# Patient Record
Sex: MALE | Race: WHITE | Employment: FULL TIME | URBAN - NONMETROPOLITAN AREA
[De-identification: names, ages, dates, MRNs, and addresses within clinical notes are randomized per-mention and may not be internally consistent; named-entity substitution may affect disease eponyms.]

---

## 2017-07-07 ENCOUNTER — PATIENT OUTREACH (OUTPATIENT)
Dept: FAMILY MEDICINE CLINIC | Facility: CLINIC | Age: 62
End: 2017-07-07

## 2017-12-22 ENCOUNTER — OFFICE VISIT (OUTPATIENT)
Dept: FAMILY MEDICINE CLINIC | Facility: CLINIC | Age: 62
End: 2017-12-22

## 2017-12-22 VITALS
SYSTOLIC BLOOD PRESSURE: 138 MMHG | HEART RATE: 84 BPM | BODY MASS INDEX: 34.3 KG/M2 | HEIGHT: 71 IN | WEIGHT: 245 LBS | DIASTOLIC BLOOD PRESSURE: 80 MMHG | OXYGEN SATURATION: 96 % | TEMPERATURE: 98 F

## 2017-12-22 DIAGNOSIS — Z12.5 SCREENING FOR PROSTATE CANCER: ICD-10-CM

## 2017-12-22 DIAGNOSIS — Z12.11 SCREEN FOR COLON CANCER: ICD-10-CM

## 2017-12-22 DIAGNOSIS — Z96.652 STATUS POST LEFT KNEE REPLACEMENT: ICD-10-CM

## 2017-12-22 DIAGNOSIS — Z23 NEED FOR VACCINATION: ICD-10-CM

## 2017-12-22 DIAGNOSIS — Z13.29 SCREENING FOR THYROID DISORDER: ICD-10-CM

## 2017-12-22 DIAGNOSIS — Z00.00 PREVENTATIVE HEALTH CARE: Primary | ICD-10-CM

## 2017-12-22 DIAGNOSIS — Z00.00 LABORATORY EXAM ORDERED AS PART OF ROUTINE GENERAL MEDICAL EXAMINATION: ICD-10-CM

## 2017-12-22 DIAGNOSIS — Z13.220 SCREENING FOR LIPID DISORDERS: ICD-10-CM

## 2017-12-22 DIAGNOSIS — E66.9 OBESITY (BMI 30.0-34.9): ICD-10-CM

## 2017-12-22 DIAGNOSIS — D12.6 TUBULAR ADENOMA OF COLON: ICD-10-CM

## 2017-12-22 PROCEDURE — 90472 IMMUNIZATION ADMIN EACH ADD: CPT | Performed by: FAMILY MEDICINE

## 2017-12-22 PROCEDURE — 99386 PREV VISIT NEW AGE 40-64: CPT | Performed by: FAMILY MEDICINE

## 2017-12-22 PROCEDURE — 90471 IMMUNIZATION ADMIN: CPT | Performed by: FAMILY MEDICINE

## 2017-12-22 PROCEDURE — 90715 TDAP VACCINE 7 YRS/> IM: CPT | Performed by: FAMILY MEDICINE

## 2017-12-22 PROCEDURE — 90686 IIV4 VACC NO PRSV 0.5 ML IM: CPT | Performed by: FAMILY MEDICINE

## 2017-12-22 RX ORDER — LISINOPRIL 10 MG/1
5 TABLET ORAL DAILY
COMMUNITY
Start: 2010-11-10 | End: 2017-12-22 | Stop reason: ALTCHOICE

## 2017-12-22 RX ORDER — LANSOPRAZOLE 15 MG/1
15 CAPSULE, DELAYED RELEASE ORAL DAILY
COMMUNITY

## 2017-12-22 NOTE — H&P
Rashaun Gonzalez is a 65year old male who presents for a complete physical exam.   HPI:   Pt voices no concerns     Wt Readings from Last 6 Encounters:  12/22/17 : 245 lb  11/26/14 : 230 lb  10/28/14 : 239 lb  09/15/14 : 235 lb  09/08/14 : 228 lb  09/02/ symptoms, no claudication , no peripheral edema  GI: denies abdominal pain,denies constipation, diarrhea, or change in bowel habits, + occasional heartburn if he eats late  : denies dysuria, hesitancy,nocturia, decreased urine stream, incontinence, erect touch right anterior thigh in the L2 dermatome, negative Tinel sign at wrist and elbows  PSYCH: affect: bright, speech: clear and coherent, Insight: appropriate  ASSESSMENT AND PLAN:   Wm Gates is a 65year old male   Preventative health care  (pr postmeal blood sugars as well as hemoglobin A1c, blood pressure lipids. I reviewed conservative management of hypertension including sodium restriction, daily aerobic activity, alcohol moderation, and maintaining ideal body weight.   I discussed importance

## 2017-12-22 NOTE — PATIENT INSTRUCTIONS
I reviewed age-appropriate preventive health screening exams. I reviewed last colonoscopy and pathology report.   Would recommend follow-up colonoscopy due to tubular adenoma  Patient given contact information for Dr. Trent Gayle as he wishes to keep his care lo

## 2018-03-24 ENCOUNTER — NURSE ONLY (OUTPATIENT)
Dept: FAMILY MEDICINE CLINIC | Facility: CLINIC | Age: 63
End: 2018-03-24

## 2018-03-24 VITALS — SYSTOLIC BLOOD PRESSURE: 152 MMHG | DIASTOLIC BLOOD PRESSURE: 90 MMHG

## 2018-03-24 DIAGNOSIS — Z00.00 LABORATORY EXAM ORDERED AS PART OF ROUTINE GENERAL MEDICAL EXAMINATION: ICD-10-CM

## 2018-03-24 DIAGNOSIS — Z13.220 SCREENING FOR LIPID DISORDERS: ICD-10-CM

## 2018-03-24 DIAGNOSIS — Z12.5 SCREENING FOR PROSTATE CANCER: ICD-10-CM

## 2018-03-24 DIAGNOSIS — Z13.29 SCREENING FOR THYROID DISORDER: ICD-10-CM

## 2018-03-24 LAB
ALBUMIN SERPL-MCNC: 3.7 G/DL (ref 3.5–4.8)
ALP LIVER SERPL-CCNC: 91 U/L (ref 45–117)
ALT SERPL-CCNC: 28 U/L (ref 17–63)
AST SERPL-CCNC: 26 U/L (ref 15–41)
BILIRUB SERPL-MCNC: 0.6 MG/DL (ref 0.1–2)
BUN BLD-MCNC: 12 MG/DL (ref 8–20)
CALCIUM BLD-MCNC: 9.2 MG/DL (ref 8.3–10.3)
CHLORIDE: 107 MMOL/L (ref 101–111)
CHOLEST SMN-MCNC: 180 MG/DL (ref ?–200)
CO2: 25 MMOL/L (ref 22–32)
COMPLEXED PSA SERPL-MCNC: 0.86 NG/ML (ref 0.01–4)
CREAT BLD-MCNC: 0.96 MG/DL (ref 0.7–1.3)
GLUCOSE BLD-MCNC: 105 MG/DL (ref 70–99)
HDLC SERPL-MCNC: 46 MG/DL (ref 45–?)
HDLC SERPL: 3.91 {RATIO} (ref ?–4.97)
LDLC SERPL CALC-MCNC: 113 MG/DL (ref ?–130)
M PROTEIN MFR SERPL ELPH: 6.9 G/DL (ref 6.1–8.3)
NONHDLC SERPL-MCNC: 134 MG/DL (ref ?–130)
POTASSIUM SERPL-SCNC: 4.5 MMOL/L (ref 3.6–5.1)
SODIUM SERPL-SCNC: 140 MMOL/L (ref 136–144)
TRIGL SERPL-MCNC: 106 MG/DL (ref ?–150)
TSI SER-ACNC: 0.51 MIU/ML (ref 0.35–5.5)
VLDLC SERPL CALC-MCNC: 21 MG/DL (ref 5–40)

## 2018-03-24 PROCEDURE — 84153 ASSAY OF PSA TOTAL: CPT | Performed by: FAMILY MEDICINE

## 2018-03-24 PROCEDURE — 80061 LIPID PANEL: CPT | Performed by: FAMILY MEDICINE

## 2018-03-24 PROCEDURE — 80053 COMPREHEN METABOLIC PANEL: CPT | Performed by: FAMILY MEDICINE

## 2018-03-24 PROCEDURE — 84443 ASSAY THYROID STIM HORMONE: CPT | Performed by: FAMILY MEDICINE

## 2018-03-24 PROCEDURE — 36415 COLL VENOUS BLD VENIPUNCTURE: CPT | Performed by: FAMILY MEDICINE

## 2018-03-24 NOTE — PROGRESS NOTES
Pt here for labwork. States his BP was elevated at Dr. Arley Gandhi yesterday-----159/?. Checked BP here today--- 152/90. Pt does not have a BP monitor at home. Do you want to see him? Nurse appt for another BP check?

## 2018-03-24 NOTE — PROGRESS NOTES
Have patient begin monitoring his blood pressure at home and follow-up in the office over the next 1-2 weeks with his monitor for comparison as well as his blood pressure readings

## 2018-04-12 ENCOUNTER — MED REC SCAN ONLY (OUTPATIENT)
Dept: FAMILY MEDICINE CLINIC | Facility: CLINIC | Age: 63
End: 2018-04-12

## 2018-04-21 ENCOUNTER — OFFICE VISIT (OUTPATIENT)
Dept: FAMILY MEDICINE CLINIC | Facility: CLINIC | Age: 63
End: 2018-04-21

## 2018-04-21 VITALS
TEMPERATURE: 98 F | SYSTOLIC BLOOD PRESSURE: 140 MMHG | OXYGEN SATURATION: 98 % | DIASTOLIC BLOOD PRESSURE: 90 MMHG | BODY MASS INDEX: 33 KG/M2 | HEART RATE: 78 BPM | WEIGHT: 235 LBS

## 2018-04-21 DIAGNOSIS — E66.9 OBESITY (BMI 30.0-34.9): ICD-10-CM

## 2018-04-21 DIAGNOSIS — Z99.89 OSA ON CPAP: ICD-10-CM

## 2018-04-21 DIAGNOSIS — I10 ESSENTIAL HYPERTENSION: Primary | ICD-10-CM

## 2018-04-21 DIAGNOSIS — G47.33 OSA ON CPAP: ICD-10-CM

## 2018-04-21 PROCEDURE — 99213 OFFICE O/P EST LOW 20 MIN: CPT | Performed by: FAMILY MEDICINE

## 2018-04-21 RX ORDER — LISINOPRIL 10 MG/1
10 TABLET ORAL DAILY
Qty: 30 TABLET | Refills: 3 | Status: SHIPPED | OUTPATIENT
Start: 2018-04-21 | End: 2018-08-27

## 2018-04-21 NOTE — PROGRESS NOTES
Luis Alberto Hackett is a 65year old male. Patient presents with:  HTN: f/u  Obstructive Sleep Apnea (AILEEN): f/u    HPI:   Patient was found to have elevated blood pressure at a nurse appointment for labs.   He had been asked to monitor his blood pressure at [facility] no dc, Throat: no erythema, pnd, or lesions  NECK: supple,no adenopathy,no bruits, no thyromegaly  LUNGS: clear to auscultation, no w/r/r  CARDIO: RRR without murmur, peripheral pulses intact  GI: good BS's,no masses, HSM or tenderness  EXTREMITIES: FROM o

## 2018-05-11 ENCOUNTER — OFFICE VISIT (OUTPATIENT)
Dept: FAMILY MEDICINE CLINIC | Facility: CLINIC | Age: 63
End: 2018-05-11

## 2018-05-11 VITALS
BODY MASS INDEX: 33 KG/M2 | OXYGEN SATURATION: 99 % | TEMPERATURE: 98 F | WEIGHT: 237 LBS | SYSTOLIC BLOOD PRESSURE: 128 MMHG | DIASTOLIC BLOOD PRESSURE: 70 MMHG | HEART RATE: 76 BPM

## 2018-05-11 DIAGNOSIS — S76.012A STRAIN OF FLEXOR MUSCLE OF LEFT HIP, INITIAL ENCOUNTER: Primary | ICD-10-CM

## 2018-05-11 DIAGNOSIS — S76.012A STRAIN OF LEFT HIP ADDUCTOR MUSCLE, INITIAL ENCOUNTER: ICD-10-CM

## 2018-05-11 PROCEDURE — 99213 OFFICE O/P EST LOW 20 MIN: CPT | Performed by: FAMILY MEDICINE

## 2018-05-11 NOTE — PROGRESS NOTES
HPI:    Patient ID: Chana Tariq is a 65year old male.   Patient presents with:  Leg Pain: GROIN PAIN--- STARTED 1 WEEK AGO---    HPI c/o left anterior groin pain, pt states 4/30 he stepped down out of his truck and felt pain, using , ice, tape, , hx Blood pressure 128/70, pulse 76, temperature 98.1 °F (36.7 °C), temperature source Temporal, weight 237 lb, SpO2 99 %.          ASSESSMENT/PLAN:   Strain of flexor muscle of left hip, initial encounter  (primary encounter diagnosis)  Strain of left hip add

## 2018-05-11 NOTE — PATIENT INSTRUCTIONS
Would recommend use of moist heat for up to 20 minutes every 4 hours as needed  May use ibuprofen up to 800 mg 3 times daily with food as needed for pain or discomfort  Strongly encourage initiating physical therapy.   Patient give [phone] central scheduling

## 2018-05-17 ENCOUNTER — OFFICE VISIT (OUTPATIENT)
Dept: PHYSICAL THERAPY | Age: 63
End: 2018-05-17
Attending: FAMILY MEDICINE
Payer: OTHER MISCELLANEOUS

## 2018-05-17 DIAGNOSIS — S76.012A STRAIN OF LEFT HIP ADDUCTOR MUSCLE, INITIAL ENCOUNTER: ICD-10-CM

## 2018-05-17 DIAGNOSIS — S76.012A STRAIN OF FLEXOR MUSCLE OF LEFT HIP, INITIAL ENCOUNTER: ICD-10-CM

## 2018-05-17 PROCEDURE — 97161 PT EVAL LOW COMPLEX 20 MIN: CPT

## 2018-05-17 PROCEDURE — 97140 MANUAL THERAPY 1/> REGIONS: CPT

## 2018-05-17 NOTE — PROGRESS NOTES
LOWER EXTREMITY EVALUATION:   Referring Physician: Dr. Sudheer Laurent  Diagnosis: L hip flexor strain     Date of Service: 5/17/2018     PATIENT SUMMARY   Oxana Loving is a 65year old y/o male who presents to therapy today with complaints of L hip pain st Flexibility:  Hip Flexor: R Mild tightness, L Significant tightness  Hamstrings: R Mild tightness; L Mild tightness  Piriformis: R Mild tightness; L Moderate tightness  Quads: R Mild tightness; L Moderate tightness    Strength/MMT:   Hip Knee   Flexi or treatment limitation: None  Rehab Potential:good    FOTO: 27/100      Patient/Family/Caregiver was advised of these findings, precautions, and treatment options and has agreed to actively participate in planning and for this course of care.     Thank you

## 2018-05-22 ENCOUNTER — OFFICE VISIT (OUTPATIENT)
Dept: PHYSICAL THERAPY | Age: 63
End: 2018-05-22
Attending: FAMILY MEDICINE
Payer: OTHER MISCELLANEOUS

## 2018-05-22 DIAGNOSIS — S76.012A STRAIN OF FLEXOR MUSCLE OF LEFT HIP, INITIAL ENCOUNTER: ICD-10-CM

## 2018-05-22 DIAGNOSIS — S76.012A STRAIN OF LEFT HIP ADDUCTOR MUSCLE, INITIAL ENCOUNTER: ICD-10-CM

## 2018-05-22 PROCEDURE — 97140 MANUAL THERAPY 1/> REGIONS: CPT

## 2018-05-22 PROCEDURE — 97014 ELECTRIC STIMULATION THERAPY: CPT

## 2018-05-22 PROCEDURE — 97035 APP MDLTY 1+ULTRASOUND EA 15: CPT

## 2018-05-22 PROCEDURE — 97110 THERAPEUTIC EXERCISES: CPT

## 2018-05-22 NOTE — PROGRESS NOTES
Dx: L hip          Authorized # of Visits:  10         Next MD visit: none scheduled  Fall Risk: standard         Precautions: n/a             Subjective: Felt good when I left, pain later in the day and worse the next day.  Pain high in outer hip and now m hip.    Charges:  Man therapy 2, therapeutic ex, US, e-stim       Total Timed Treatment: 45 min  Total Treatment Time: 55 min

## 2018-05-25 ENCOUNTER — OFFICE VISIT (OUTPATIENT)
Dept: PHYSICAL THERAPY | Age: 63
End: 2018-05-25
Attending: FAMILY MEDICINE
Payer: OTHER MISCELLANEOUS

## 2018-05-25 DIAGNOSIS — S76.012A STRAIN OF LEFT HIP ADDUCTOR MUSCLE, INITIAL ENCOUNTER: ICD-10-CM

## 2018-05-25 DIAGNOSIS — S76.012A STRAIN OF FLEXOR MUSCLE OF LEFT HIP, INITIAL ENCOUNTER: ICD-10-CM

## 2018-05-25 PROCEDURE — 97140 MANUAL THERAPY 1/> REGIONS: CPT

## 2018-05-25 PROCEDURE — 97014 ELECTRIC STIMULATION THERAPY: CPT

## 2018-05-25 PROCEDURE — 97035 APP MDLTY 1+ULTRASOUND EA 15: CPT

## 2018-05-25 PROCEDURE — 97110 THERAPEUTIC EXERCISES: CPT

## 2018-05-25 NOTE — PROGRESS NOTES
Dx: L hip          Authorized # of Visits:  10         Next MD visit: none scheduled  Fall Risk: standard         Precautions: n/a             Subjective: Was sore after last session for rest of day, but then felt good next two days.  Still can't get comfor Skilled Services: STM/ASTYM to L hip for pain and increase mobility of hip. ROM and stretching to increase mobility of hip. Charges:  Man therapy 2, therapeutic ex, US, e-stim       Total Timed Treatment: 45 min  Total Treatment Time: 55

## 2018-05-29 ENCOUNTER — OFFICE VISIT (OUTPATIENT)
Dept: PHYSICAL THERAPY | Age: 63
End: 2018-05-29
Attending: FAMILY MEDICINE
Payer: OTHER MISCELLANEOUS

## 2018-05-29 PROCEDURE — 97014 ELECTRIC STIMULATION THERAPY: CPT

## 2018-05-29 PROCEDURE — 97110 THERAPEUTIC EXERCISES: CPT

## 2018-05-29 PROCEDURE — 97035 APP MDLTY 1+ULTRASOUND EA 15: CPT

## 2018-05-29 PROCEDURE — 97140 MANUAL THERAPY 1/> REGIONS: CPT

## 2018-05-29 NOTE — PROGRESS NOTES
Dx: L hip          Authorized # of Visits:  10         Next MD visit: none scheduled  Fall Risk: standard         Precautions: n/a             Subjective: Groin feels better, but some pain in knee and lower leg.  Did a lot of running around house yesterday, PROM  6' Hip PROM  6' Hip PROM  8'       US  IP 7'  1.2 w/cm2  1mhz US  IP 7'  1.2 w/cm2  1mhz US  IP 7'  1.2 w/cm2  1mhz       IFC/CP  12'  L hip IFC/CP  12'  L hip IFC/CP  12'  L hip         Nu-step  10' Lv3                                  Skilled Sonna Guard

## 2018-05-30 ENCOUNTER — APPOINTMENT (OUTPATIENT)
Dept: PHYSICAL THERAPY | Age: 63
End: 2018-05-30
Attending: FAMILY MEDICINE
Payer: OTHER MISCELLANEOUS

## 2018-05-31 ENCOUNTER — OFFICE VISIT (OUTPATIENT)
Dept: PHYSICAL THERAPY | Age: 63
End: 2018-05-31
Attending: FAMILY MEDICINE
Payer: OTHER MISCELLANEOUS

## 2018-05-31 DIAGNOSIS — S76.012A STRAIN OF LEFT HIP ADDUCTOR MUSCLE, INITIAL ENCOUNTER: ICD-10-CM

## 2018-05-31 DIAGNOSIS — S76.012A STRAIN OF FLEXOR MUSCLE OF LEFT HIP, INITIAL ENCOUNTER: ICD-10-CM

## 2018-05-31 PROCEDURE — 97140 MANUAL THERAPY 1/> REGIONS: CPT

## 2018-05-31 PROCEDURE — 97110 THERAPEUTIC EXERCISES: CPT

## 2018-05-31 NOTE — PROGRESS NOTES
Dx: L hip          Authorized # of Visits:  10         Next MD visit: none scheduled  Fall Risk: standard         Precautions: n/a             Subjective: Continue to feel better in groin/hip flexor, but feel pain in R shin with standing and walking.   Monika Mcclain 3x30\" Stretching  IP 3x30\"  Hip add 3x30\"  ITB 3x30\"      Hip PROM  6' Hip PROM  6' Hip PROM  8' Hip PROM  8'      US  IP 7'  1.2 w/cm2  1mhz US  IP 7'  1.2 w/cm2  1mhz US  IP 7'  1.2 w/cm2  1mhz -----      IFC/CP  12'  L hip IFC/CP  12'  L hip IFC/CP

## 2018-06-11 ENCOUNTER — OFFICE VISIT (OUTPATIENT)
Dept: PHYSICAL THERAPY | Age: 63
End: 2018-06-11
Attending: FAMILY MEDICINE
Payer: OTHER MISCELLANEOUS

## 2018-06-11 PROCEDURE — 97110 THERAPEUTIC EXERCISES: CPT

## 2018-06-11 PROCEDURE — 97140 MANUAL THERAPY 1/> REGIONS: CPT

## 2018-06-11 NOTE — PROGRESS NOTES
Dx: L hip          Authorized # of Visits:  10         Next MD visit: none scheduled  Fall Risk: standard         Precautions: n/a             Subjective: Hip still feeling better, but get pain in shin area.  Can tell pain in shin is coming from the hip, ho Stretching  IP 3x30\"  Hip add 3x30\"  ITB 3x30\" Stretching  IP 3x30\"  Hip add 3x30\"  ITB 3x30\"     Hip PROM  6' Hip PROM  6' Hip PROM  8' Hip PROM  8' Hip PROM  8'     US  IP 7'  1.2 w/cm2  1mhz US  IP 7'  1.2 w/cm2  1mhz US  IP 7'  1.2 w/cm2  1mhz --

## 2018-06-14 ENCOUNTER — APPOINTMENT (OUTPATIENT)
Dept: PHYSICAL THERAPY | Age: 63
End: 2018-06-14
Attending: FAMILY MEDICINE
Payer: OTHER MISCELLANEOUS

## 2018-06-18 ENCOUNTER — OFFICE VISIT (OUTPATIENT)
Dept: PHYSICAL THERAPY | Age: 63
End: 2018-06-18
Attending: FAMILY MEDICINE
Payer: OTHER MISCELLANEOUS

## 2018-06-18 PROCEDURE — 97110 THERAPEUTIC EXERCISES: CPT

## 2018-06-18 PROCEDURE — 97140 MANUAL THERAPY 1/> REGIONS: CPT

## 2018-06-18 NOTE — PROGRESS NOTES
Dx: L hip          Authorized # of Visits:  10         Next MD visit: none scheduled  Fall Risk: standard         Precautions: n/a             Subjective: Pain greatly reduced.  Still get some discomfort when transitioning from sitting to standing, but much thigh   20'    Stretching  IP 3x30\"  Hip add 3x30\"  ITB 3x30\" Stretching  IP 3x30\"  Hip add 3x30\"  ITB 3x30\" Stretching  IP 3x30\"  Hip add 3x30\"  ITB 3x30\" Stretching  IP 3x30\"  Hip add 3x30\"  ITB 3x30\" Stretching  IP 3x30\"  Hip add 3x30\"  IT

## 2018-06-21 ENCOUNTER — OFFICE VISIT (OUTPATIENT)
Dept: PHYSICAL THERAPY | Age: 63
End: 2018-06-21
Attending: FAMILY MEDICINE
Payer: OTHER MISCELLANEOUS

## 2018-06-21 PROCEDURE — 97140 MANUAL THERAPY 1/> REGIONS: CPT

## 2018-06-21 PROCEDURE — 97110 THERAPEUTIC EXERCISES: CPT

## 2018-06-21 NOTE — PROGRESS NOTES
Dx: L hip          Authorized # of Visits:  10         Next MD visit: none scheduled  Fall Risk: standard         Precautions: n/a             Subjective: Feeling much better, able to sleep at night now.  Still feel tightness in anterior thigh, but not as b 3x30\"  Hip add 3x30\"  ITB 3x30\" Stretching  IP 3x30\"  Hip add 3x30\"  ITB 3x30\" Stretching  IP 3x30\"  Hip add 3x30\"  ITB 3x30\" Stretching  IP 3x30\"  Hip add 3x30\"  ITB 3x30\" Stretching  IP 3x30\"  Hip add 3x30\"  ITB 3x30\" Stretching  IP 3x30\"

## 2018-06-25 ENCOUNTER — OFFICE VISIT (OUTPATIENT)
Dept: PHYSICAL THERAPY | Age: 63
End: 2018-06-25
Attending: FAMILY MEDICINE
Payer: OTHER MISCELLANEOUS

## 2018-06-25 PROCEDURE — 97110 THERAPEUTIC EXERCISES: CPT

## 2018-06-25 PROCEDURE — 97140 MANUAL THERAPY 1/> REGIONS: CPT

## 2018-06-25 NOTE — PROGRESS NOTES
Dx: L hip          Authorized # of Visits:  10         Next MD visit: none scheduled  Fall Risk: standard         Precautions: n/a             Subjective: Continue to feel better, just healing slower than I'd like.  Still get some pain in shin when I first 3x30\" Stretching  IP 3x30\"  Hip add 3x30\"  ITB 3x30\" Stretching  IP 3x30\"  Hip add 3x30\"  ITB 3x30\" Stretching  IP 3x30\"  Hip add 3x30\"  ITB 3x30\" Stretching  IP 3x30\"  Hip add 3x30\"  ITB 3x30\" Stretching  IP 3x30\"  Hip add 3x30\"  ITB 3x30\"

## 2018-06-28 ENCOUNTER — OFFICE VISIT (OUTPATIENT)
Dept: PHYSICAL THERAPY | Age: 63
End: 2018-06-28
Attending: FAMILY MEDICINE
Payer: OTHER MISCELLANEOUS

## 2018-06-28 PROCEDURE — 97140 MANUAL THERAPY 1/> REGIONS: CPT

## 2018-06-28 PROCEDURE — 97110 THERAPEUTIC EXERCISES: CPT

## 2018-06-28 NOTE — PROGRESS NOTES
Progress Summary    Pt has attended 11, cancelled 1, and no shown 0 visits in Physical Therapy. Subjective: Feel 75-80% improved since start of therapy. Definitely getting better, but just not as fast as I'd like.  Pain in hip is mild and pain into margarita walking (8 visits)-on going  · Pt will demonstrate improved SLS to >30 seconds MELISSA to promote safety and decrease risk of falls on uneven surfaces such as grass (8 visits)- on going  · Pt will be able to squat to  light objects around the house with rectus, IP   12' STM  L rectus, IP   12'   Stretching  IP 3x30\"  Hip add 3x30\"  ITB 3x30\" Stretching  IP 3x30\"  Hip add 3x30\"  ITB 3x30\" Stretching  IP 3x30\"  Hip add 3x30\"  ITB 3x30\" Stretching  IP 3x30\"  Hip add 3x30\"  ITB 3x30\" Stretching  I

## 2018-07-02 ENCOUNTER — OFFICE VISIT (OUTPATIENT)
Dept: FAMILY MEDICINE CLINIC | Facility: CLINIC | Age: 63
End: 2018-07-02

## 2018-07-02 VITALS
OXYGEN SATURATION: 99 % | BODY MASS INDEX: 33 KG/M2 | WEIGHT: 235 LBS | TEMPERATURE: 98 F | DIASTOLIC BLOOD PRESSURE: 80 MMHG | SYSTOLIC BLOOD PRESSURE: 126 MMHG | HEART RATE: 70 BPM

## 2018-07-02 DIAGNOSIS — M75.42 IMPINGEMENT SYNDROME OF LEFT SHOULDER: ICD-10-CM

## 2018-07-02 DIAGNOSIS — S76.012D STRAIN OF FLEXOR MUSCLE OF LEFT HIP, SUBSEQUENT ENCOUNTER: Primary | ICD-10-CM

## 2018-07-02 PROCEDURE — 99214 OFFICE O/P EST MOD 30 MIN: CPT | Performed by: FAMILY MEDICINE

## 2018-07-02 NOTE — PROGRESS NOTES
HPI:    Patient ID: Mahesh You is a 65year old male. Patient presents with:  Hip Pain: f/u after therapy    HPI patient seen 5/11/18 for left hip flexor strain. He has been going to physical therapy since that time.   Last therapy report notes diego Left shoulder: Full range of motion with discomfort with abduction, mild anterior instability, negative labral grind, positive Neer's at 160°, positive Alvarez, negative Elliot sign    Patient able to squat and rise without discomfort, able to walk on toes a

## 2018-07-02 NOTE — PATIENT INSTRUCTIONS
I reviewed home exercise regimen, modified hip flexor and groin stretches, and calf stretching  Patient instructed in scapular stabilization and rotator cuff strengthening resistance band exercises.   Discussed proper posture  Continue physical therapy  Act

## 2018-07-05 ENCOUNTER — OFFICE VISIT (OUTPATIENT)
Dept: PHYSICAL THERAPY | Age: 63
End: 2018-07-05
Attending: FAMILY MEDICINE
Payer: OTHER MISCELLANEOUS

## 2018-07-05 PROCEDURE — 97110 THERAPEUTIC EXERCISES: CPT

## 2018-07-05 PROCEDURE — 97140 MANUAL THERAPY 1/> REGIONS: CPT

## 2018-07-05 NOTE — PROGRESS NOTES
Dx: L hip          Authorized # of Visits:  25         Next MD visit: none scheduled  Fall Risk: standard         Precautions: n/a             Subjective: MD said wants to continue PT, Add gastroc stretching. Worked late yesterday, very fatigued today. 3x30\"  Hip add 3x30\"  ITB 3x30\" Stretching  IP 3x30\"  Hip add 3x30\"  ITB 3x30\" Stretching  IP 3x30\"  Hip add 3x30\"  ITB 3x30\" Stretching  IP 3x30\"  Hip add 3x30\"  ITB 3x30\" Stretching  IP 3x30\"  Hip add 3x30\"  ITB 3x30\" Stretching  IP 3x30\"

## 2018-07-09 ENCOUNTER — OFFICE VISIT (OUTPATIENT)
Dept: PHYSICAL THERAPY | Age: 63
End: 2018-07-09
Attending: FAMILY MEDICINE
Payer: OTHER MISCELLANEOUS

## 2018-07-09 PROCEDURE — 97110 THERAPEUTIC EXERCISES: CPT

## 2018-07-09 PROCEDURE — 97140 MANUAL THERAPY 1/> REGIONS: CPT

## 2018-07-09 NOTE — PROGRESS NOTES
Dx: L hip          Authorized # of Visits:  25         Next MD visit: none scheduled  Fall Risk: standard         Precautions: n/a             Subjective: Was busy with party at house all weekend.  Between cleaning and entertaining was on feet all weekend thigh   15' STM  L rectus, IP   12' STM  L rectus, IP   12' STM  L rectus, IP   12' STM  L rectus, IP   18'   Stretching  IP 3x30\"  Hip add 3x30\"  ITB 3x30\" Stretching  IP 3x30\"  Hip add 3x30\"  ITB 3x30\" Stretching  IP 3x30\"  Hip add 3x30\"  ITB 3x3

## 2018-07-12 ENCOUNTER — OFFICE VISIT (OUTPATIENT)
Dept: PHYSICAL THERAPY | Age: 63
End: 2018-07-12
Attending: FAMILY MEDICINE
Payer: OTHER MISCELLANEOUS

## 2018-07-12 PROCEDURE — 97110 THERAPEUTIC EXERCISES: CPT

## 2018-07-12 PROCEDURE — 97140 MANUAL THERAPY 1/> REGIONS: CPT

## 2018-07-12 NOTE — PROGRESS NOTES
Dx: L hip          Authorized # of Visits:  25         Next MD visit: none scheduled  Fall Risk: standard         Precautions: n/a             Subjective: No pain on Tuesday, was sitting in truck most of the day.  Still get pain getting in and out of david IP   18' STM  L rectus, IP   18'   Stretching  IP 3x30\"  Hip add 3x30\"  ITB 3x30\" Stretching  IP 3x30\"  Hip add 3x30\"  ITB 3x30\" Stretching  IP 3x30\"  Hip add 3x30\"  ITB 3x30\" Stretching  IP 3x30\"  Hip add 3x30\"  ITB 3x30\" Stretching  IP 3x30\"

## 2018-07-16 ENCOUNTER — APPOINTMENT (OUTPATIENT)
Dept: PHYSICAL THERAPY | Age: 63
End: 2018-07-16
Attending: FAMILY MEDICINE
Payer: OTHER MISCELLANEOUS

## 2018-07-17 ENCOUNTER — OFFICE VISIT (OUTPATIENT)
Dept: PHYSICAL THERAPY | Age: 63
End: 2018-07-17
Attending: FAMILY MEDICINE
Payer: OTHER MISCELLANEOUS

## 2018-07-17 PROCEDURE — 97140 MANUAL THERAPY 1/> REGIONS: CPT

## 2018-07-17 PROCEDURE — 97110 THERAPEUTIC EXERCISES: CPT

## 2018-07-17 NOTE — PROGRESS NOTES
Dx: L hip          Authorized # of Visits:  25         Next MD visit: none scheduled  Fall Risk: standard         Precautions: n/a             Subjective: A little better, but still get pain getting in/out of truck and walking for lengths of time.      Ob 3x30\"  Hip add 3x30\"  ITB 3x30\" Stretching  IP 3x30\"  Hip add 3x30\"  ITB 3x30\" Stretching  IP 3x30\"  Hip add 3x30\"  ITB 3x30\" Stretching  IP 3x30\"  Hip add 3x30\"  ITB 3x30\" Stretching  IP 3x30\"  Hip add 3x30\"  gastroc 3x30\" Stretching  IP 3x

## 2018-07-19 ENCOUNTER — OFFICE VISIT (OUTPATIENT)
Dept: PHYSICAL THERAPY | Age: 63
End: 2018-07-19
Attending: FAMILY MEDICINE
Payer: OTHER MISCELLANEOUS

## 2018-07-19 PROCEDURE — 97035 APP MDLTY 1+ULTRASOUND EA 15: CPT

## 2018-07-19 PROCEDURE — 97140 MANUAL THERAPY 1/> REGIONS: CPT

## 2018-07-19 PROCEDURE — 97110 THERAPEUTIC EXERCISES: CPT

## 2018-07-19 NOTE — PROGRESS NOTES
Dx: L hip          Authorized # of Visits:  25         Next MD visit: none scheduled    Dx: L hip          Authorized # of Visits:  25         Next MD visit: none scheduled  Fall Risk: standard         Precautions: n/a             Subjective: Gloria Cancino 3x30\" Stretching  IP 3x30\"  Hip add 3x30\"  ITB 3x30\" Stretching  IP 3x30\"  Hip add 3x30\"  ITB 3x30\" Stretching  IP 3x30\"  Hip add 3x30\"  gastroc 3x30\" Stretching  IP 3x30\"  Hip add 3x30\"  gastroc 3x30\" Stretching  IP 3x30\"  Hip add 3x30\"  ga

## 2018-07-23 ENCOUNTER — OFFICE VISIT (OUTPATIENT)
Dept: PHYSICAL THERAPY | Age: 63
End: 2018-07-23
Attending: FAMILY MEDICINE
Payer: OTHER MISCELLANEOUS

## 2018-07-23 PROCEDURE — 97110 THERAPEUTIC EXERCISES: CPT

## 2018-07-23 PROCEDURE — 97140 MANUAL THERAPY 1/> REGIONS: CPT

## 2018-07-23 PROCEDURE — 97035 APP MDLTY 1+ULTRASOUND EA 15: CPT

## 2018-07-23 NOTE — PROGRESS NOTES
Dx: L hip pain         Authorized # of Visits:  25         Next MD visit: none scheduled    Dx: L hip          Authorized # of Visits:  25         Next MD visit: none scheduled  Fall Risk: standard         Precautions: n/a             Subjective: Some 7/19/2018  TX#: 5/8 Date: 7/23/2018  TX#: 6/8   STM/Astym  L hip 20' STM  L rectus, IP   12' STM  L rectus, IP   12' STM  L rectus, IP   18' STM  L rectus, IP   18' STM  L rectus, IP   18' STM  L rectus, IP   20' STM  L rectus, IP   20'   Stretching  IP 3x

## 2018-07-26 ENCOUNTER — OFFICE VISIT (OUTPATIENT)
Dept: FAMILY MEDICINE CLINIC | Facility: CLINIC | Age: 63
End: 2018-07-26
Payer: COMMERCIAL

## 2018-07-26 ENCOUNTER — HOSPITAL ENCOUNTER (OUTPATIENT)
Dept: GENERAL RADIOLOGY | Age: 63
Discharge: HOME | End: 2018-07-26
Attending: FAMILY MEDICINE
Payer: OTHER MISCELLANEOUS

## 2018-07-26 ENCOUNTER — TELEPHONE (OUTPATIENT)
Dept: FAMILY MEDICINE CLINIC | Facility: CLINIC | Age: 63
End: 2018-07-26

## 2018-07-26 ENCOUNTER — APPOINTMENT (OUTPATIENT)
Dept: PHYSICAL THERAPY | Age: 63
End: 2018-07-26
Attending: FAMILY MEDICINE
Payer: OTHER MISCELLANEOUS

## 2018-07-26 VITALS
DIASTOLIC BLOOD PRESSURE: 80 MMHG | SYSTOLIC BLOOD PRESSURE: 120 MMHG | OXYGEN SATURATION: 99 % | BODY MASS INDEX: 33 KG/M2 | TEMPERATURE: 98 F | WEIGHT: 235 LBS | HEART RATE: 76 BPM

## 2018-07-26 DIAGNOSIS — M16.12 PRIMARY OSTEOARTHRITIS OF LEFT HIP: ICD-10-CM

## 2018-07-26 DIAGNOSIS — M25.552 LEFT HIP PAIN: Primary | ICD-10-CM

## 2018-07-26 DIAGNOSIS — M75.42 IMPINGEMENT SYNDROME OF LEFT SHOULDER: ICD-10-CM

## 2018-07-26 DIAGNOSIS — M75.42 SHOULDER IMPINGEMENT SYNDROME, LEFT: ICD-10-CM

## 2018-07-26 DIAGNOSIS — M25.552 LEFT HIP PAIN: ICD-10-CM

## 2018-07-26 PROCEDURE — 73502 X-RAY EXAM HIP UNI 2-3 VIEWS: CPT | Performed by: FAMILY MEDICINE

## 2018-07-26 PROCEDURE — 99213 OFFICE O/P EST LOW 20 MIN: CPT | Performed by: FAMILY MEDICINE

## 2018-07-26 PROCEDURE — 73030 X-RAY EXAM OF SHOULDER: CPT | Performed by: FAMILY MEDICINE

## 2018-07-26 NOTE — PATIENT INSTRUCTIONS
I reviewed x-ray findings with patient. I do not feel that additional physical therapy as can be helpful  I recommend an orthopedic opinion. Patient wishes to see Dr. Yordan Gillis as he did his back surgery and is close to his home. Activity as tolerated.

## 2018-07-26 NOTE — TELEPHONE ENCOUNTER
If pt had a orthopedic doctor in [facility] for his hip would he have pt in [place]?

## 2018-07-26 NOTE — TELEPHONE ENCOUNTER
Attempted to call patient, phone was disconnected    Called back back again, he states that he wanted to see Dr. Ruben Funes, he is no longer practicing, patient is asking who you would recommend, thought maybe Dr. Gunnar Nunez, and would need new referral enter

## 2018-07-30 ENCOUNTER — APPOINTMENT (OUTPATIENT)
Dept: PHYSICAL THERAPY | Age: 63
End: 2018-07-30
Attending: FAMILY MEDICINE
Payer: OTHER MISCELLANEOUS

## 2018-08-09 ENCOUNTER — TELEPHONE (OUTPATIENT)
Dept: FAMILY MEDICINE CLINIC | Facility: CLINIC | Age: 63
End: 2018-08-09

## 2018-08-09 NOTE — TELEPHONE ENCOUNTER
REC'D REQ. 8-9-18. SENT RECORDS REQ. TO SCAN STAT REQ. RECORDS 4-30-18 TO PRESENT.  ANYTHING RELATED TO W/C

## 2018-08-27 RX ORDER — LISINOPRIL 10 MG/1
TABLET ORAL
Qty: 90 TABLET | Refills: 0 | Status: SHIPPED | OUTPATIENT
Start: 2018-08-27 | End: 2018-12-01

## 2018-09-27 ENCOUNTER — TELEPHONE (OUTPATIENT)
Dept: FAMILY MEDICINE CLINIC | Facility: CLINIC | Age: 63
End: 2018-09-27

## 2018-09-27 NOTE — TELEPHONE ENCOUNTER
Diamond calls from [employer] to give approval for Claudean Starring to see Dr. Myke Woodruff, ortho, to eval and treat. Patient prefers to see Dr. Jose M Zepeda, Dr. Myke Woodruff has moved his practice to [facility] and he prefers not to travel that far.   Diamond advised of the patie

## 2018-12-01 RX ORDER — LISINOPRIL 10 MG/1
TABLET ORAL
Qty: 90 TABLET | Refills: 0 | Status: SHIPPED | OUTPATIENT
Start: 2018-12-01 | End: 2019-02-21

## 2018-12-01 NOTE — TELEPHONE ENCOUNTER
Last OV: 7/26/2018  Last filled: 8/27/2018 #90 no RF    Future Appointments   Date Time Provider Les Anderson   12/5/2018  2:00 PM [facility] FL[facility]OSCOPY Jeannine Rodriguez   12/5/2018  3:40 PM Michaela Hayes MD [facility]   12/18/2018  8:50 AM Phoebe Barrientos

## 2019-01-09 ENCOUNTER — TELEPHONE (OUTPATIENT)
Dept: FAMILY MEDICINE CLINIC | Facility: CLINIC | Age: 64
End: 2019-01-09

## 2019-01-09 NOTE — TELEPHONE ENCOUNTER
PC to Dr. Kenny [facility]---ph# [phone]----A/T for her nurse Delaney to c/b ---direct ph# [phone]. Advised we need pre-op orders before we can schedule a pre-op px. There is an order for a BMP in Epic---placed by Dr. Eliza Landeros.   *DOES HE NE

## 2019-01-09 NOTE — TELEPHONE ENCOUNTER
HE IS HAVING SURGERY ON 1/28 AND SAID HE NEEDS AN EKG AND LABS DONE 1 WEEK BEFORE AND IT IS A W/C ISSUE

## 2019-01-10 NOTE — TELEPHONE ENCOUNTER
Contacted pt, appt scheduled.      Future Appointments   Date Time Provider Les Monica   1/22/2019  8:30 AM Za Mohamud, DO [facility]   1/28/2019  7:45 AM Tyron Humphries MD [facility]   2/11/2019  1:10 PM Tyron Humphries MD

## 2019-01-22 ENCOUNTER — APPOINTMENT (OUTPATIENT)
Dept: LAB | Age: 64
End: 2019-01-22
Attending: FAMILY MEDICINE
Payer: OTHER MISCELLANEOUS

## 2019-01-22 ENCOUNTER — OFFICE VISIT (OUTPATIENT)
Dept: FAMILY MEDICINE CLINIC | Facility: CLINIC | Age: 64
End: 2019-01-22
Payer: OTHER MISCELLANEOUS

## 2019-01-22 VITALS
HEIGHT: 72 IN | SYSTOLIC BLOOD PRESSURE: 130 MMHG | RESPIRATION RATE: 12 BRPM | HEART RATE: 71 BPM | WEIGHT: 255 LBS | DIASTOLIC BLOOD PRESSURE: 84 MMHG | TEMPERATURE: 98 F | OXYGEN SATURATION: 97 % | BODY MASS INDEX: 34.54 KG/M2

## 2019-01-22 DIAGNOSIS — G47.33 OSA ON CPAP: ICD-10-CM

## 2019-01-22 DIAGNOSIS — M16.12 PRIMARY OSTEOARTHRITIS OF LEFT HIP: ICD-10-CM

## 2019-01-22 DIAGNOSIS — I10 ESSENTIAL HYPERTENSION: ICD-10-CM

## 2019-01-22 DIAGNOSIS — Z01.818 PRE-OP EVALUATION: Primary | ICD-10-CM

## 2019-01-22 DIAGNOSIS — Z99.89 OSA ON CPAP: ICD-10-CM

## 2019-01-22 DIAGNOSIS — M75.122 COMPLETE TEAR OF LEFT ROTATOR CUFF: ICD-10-CM

## 2019-01-22 DIAGNOSIS — Z96.652 STATUS POST LEFT KNEE REPLACEMENT: ICD-10-CM

## 2019-01-22 DIAGNOSIS — S46.119A SUBLUXATION OF TENDON OF LONG HEAD OF BICEPS: ICD-10-CM

## 2019-01-22 PROBLEM — K21.9 GASTROESOPHAGEAL REFLUX DISEASE: Status: ACTIVE | Noted: 2019-01-22

## 2019-01-22 LAB
ANION GAP SERPL CALC-SCNC: 6 MMOL/L (ref 0–18)
BUN BLD-MCNC: 12 MG/DL (ref 8–20)
BUN/CREAT SERPL: 11.9 (ref 10–20)
CALCIUM BLD-MCNC: 9 MG/DL (ref 8.3–10.3)
CHLORIDE SERPL-SCNC: 109 MMOL/L (ref 101–111)
CO2 SERPL-SCNC: 25 MMOL/L (ref 22–32)
CREAT BLD-MCNC: 1.01 MG/DL (ref 0.7–1.3)
GLUCOSE BLD-MCNC: 96 MG/DL (ref 70–99)
OSMOLALITY SERPL CALC.SUM OF ELEC: 290 MOSM/KG (ref 275–295)
POTASSIUM SERPL-SCNC: 4.4 MMOL/L (ref 3.6–5.1)
SODIUM SERPL-SCNC: 140 MMOL/L (ref 136–144)

## 2019-01-22 PROCEDURE — 99244 OFF/OP CNSLTJ NEW/EST MOD 40: CPT | Performed by: FAMILY MEDICINE

## 2019-01-22 PROCEDURE — 93000 ELECTROCARDIOGRAM COMPLETE: CPT | Performed by: FAMILY MEDICINE

## 2019-01-22 NOTE — H&P
Ella Le is a 59year old male Patient presents with:  Pre-Op Exam: pre-op consult for shoulder surgery    HPI:   Pt presents for preoperative consultation as requested by Ronnie Payton.   Patient is scheduled for left shoulder arthroscopy, debrideme History:  Smoking status: Light Tobacco Smoker                                                       Packs/day: 0.00      Years: 0.00         Types: Cigars  Smokeless tobacco: Never Used                      Alcohol use: No            Occ: .  Felisha Range distress  SKIN: no rashes,no suspicious lesions, epidermal nevi lower back, left side of neck, café au lait right upper posterior thigh  ENT: EAC:  Clear, TMs: intact, Nose: turbinates normal, septum: midline, discharge: none, Pharynx: class 3 airway, poor postoperative ventilatory status in the setting of known obstructive sleep apnea.   If patient will be admitted overnight, he is to bring his CPAP appliance to the hospital.  Patient advised to discontinue alcohol and tobacco at least 3 days prior to surger

## 2019-02-21 RX ORDER — LISINOPRIL 10 MG/1
TABLET ORAL
Qty: 90 TABLET | Refills: 1 | Status: SHIPPED | OUTPATIENT
Start: 2019-02-21 | End: 2019-09-05

## 2019-03-18 NOTE — PROGRESS NOTES
POST-OP SHOULDER EVALUATION:   Referring Physician: Dr. Evans Ho  Diagnosis: s/p L RTC repair 1/28/2019     Date of Service: 3/19/2019     PATIENT SUMMARY   Erendira Marin is a 59year old y/o male who presents to therapy today s/p RTC repair on 1/28/20 Flexion: R 5/5; L NT/5  Abduction: R 5/5; L NT/5  ER: R 5/5; L NT/5  IR: R 5/5; L NT/5 Rhomboids: R4+/5, L 4/5  Mid trap: R 4+/5; L 4/5  Lats: R 5/5, L NT/5  Low trap: R 4+/5; L 4/5     Today’s Treatment and Response: Patient education provided on anatom 51/100      Patient/Family/Caregiver was advised of these findings, precautions, and treatment options and has agreed to actively participate in planning and for this course of care.     Thank you for your referral. Please co-sign or sign and return this le

## 2019-03-19 ENCOUNTER — OFFICE VISIT (OUTPATIENT)
Dept: PHYSICAL THERAPY | Age: 64
End: 2019-03-19
Attending: PHYSICIAN ASSISTANT
Payer: OTHER MISCELLANEOUS

## 2019-03-19 DIAGNOSIS — Z47.89 AFTERCARE FOLLOWING SURGERY OF THE MUSCULOSKELETAL SYSTEM: ICD-10-CM

## 2019-03-19 PROCEDURE — 97110 THERAPEUTIC EXERCISES: CPT

## 2019-03-19 PROCEDURE — 97140 MANUAL THERAPY 1/> REGIONS: CPT

## 2019-03-19 PROCEDURE — 97162 PT EVAL MOD COMPLEX 30 MIN: CPT

## 2019-03-19 NOTE — PROGRESS NOTES
Dx: s/p RTC repair L         Authorized # of Visits:  12         Next MD visit: none scheduled  Fall Risk: standard         Precautions: no strengthening until 4/22/19             Subjective: Fatigued overall, been doing some housework this morning, should TX#: 8/   Pulleys 4'         Joint mobilization  GH inf/post glide  Grade 3          STM shoulder         PROM all planes L shoulder         AAROM wand  Flex, scap, ER 2x10ea                                             Skilled Services: joint mo

## 2019-03-21 ENCOUNTER — OFFICE VISIT (OUTPATIENT)
Dept: PHYSICAL THERAPY | Age: 64
End: 2019-03-21
Attending: PHYSICIAN ASSISTANT
Payer: OTHER MISCELLANEOUS

## 2019-03-21 PROCEDURE — 97140 MANUAL THERAPY 1/> REGIONS: CPT

## 2019-03-21 PROCEDURE — 97110 THERAPEUTIC EXERCISES: CPT

## 2019-03-26 ENCOUNTER — OFFICE VISIT (OUTPATIENT)
Dept: PHYSICAL THERAPY | Age: 64
End: 2019-03-26
Attending: PHYSICIAN ASSISTANT
Payer: OTHER MISCELLANEOUS

## 2019-03-26 PROCEDURE — 97140 MANUAL THERAPY 1/> REGIONS: CPT

## 2019-03-26 PROCEDURE — 97110 THERAPEUTIC EXERCISES: CPT

## 2019-03-26 NOTE — PROGRESS NOTES
Dx: s/p RTC repair L         Authorized # of Visits:  12         Next MD visit: none scheduled  Fall Risk: standard         Precautions: no strengthening until 4/22/19             Subjective: Did some yard clean up at home yesterday, shoulder sore.     Monika Mcclain TX#: 7/ Date:               TX#: 8/   Pulleys 4' Pulleys 4'        Joint mobilization  [facility] inf/post glide  Grade 3  Joint mobilization  GH inf/post glide  Grade 3         STM shoulder STM biceps/shoulder        PROM all planes L shoulder PROM all

## 2019-03-27 ENCOUNTER — OFFICE VISIT (OUTPATIENT)
Dept: PHYSICAL THERAPY | Age: 64
End: 2019-03-27
Attending: ORTHOPAEDIC SURGERY
Payer: OTHER MISCELLANEOUS

## 2019-03-27 PROCEDURE — 97140 MANUAL THERAPY 1/> REGIONS: CPT

## 2019-03-27 PROCEDURE — 97110 THERAPEUTIC EXERCISES: CPT

## 2019-03-27 NOTE — PROGRESS NOTES
Dx: s/p RTC repair L         Authorized # of Visits:  12         Next MD visit: none scheduled  Fall Risk: standard         Precautions: no strengthening until 4/22/19             Subjective: Not as sore as yesterday.  Not sure if doing wand exercises corre TX#: 7/ Date:               TX#: 8/   Pulleys 4' Pulleys 4' Pulleys 4'       Joint mobilization  GH inf/post glide  Grade 3  Joint mobilization  GH inf/post glide  Grade 3  Joint mobilization  GH inf/post glide  Grade 3        STM shoulder STM biceps/shoul

## 2019-03-28 ENCOUNTER — APPOINTMENT (OUTPATIENT)
Dept: PHYSICAL THERAPY | Age: 64
End: 2019-03-28
Attending: PHYSICIAN ASSISTANT
Payer: OTHER MISCELLANEOUS

## 2019-04-02 ENCOUNTER — OFFICE VISIT (OUTPATIENT)
Dept: PHYSICAL THERAPY | Age: 64
End: 2019-04-02
Attending: PHYSICIAN ASSISTANT
Payer: OTHER MISCELLANEOUS

## 2019-04-02 PROCEDURE — 97110 THERAPEUTIC EXERCISES: CPT

## 2019-04-02 PROCEDURE — 97140 MANUAL THERAPY 1/> REGIONS: CPT

## 2019-04-02 NOTE — PROGRESS NOTES
Dx: s/p RTC repair L         Authorized # of Visits:  12         Next MD visit: none scheduled  Fall Risk: standard         Precautions: no strengthening until 4/22/19             Subjective: Feel some soreness in shoulder, did some work in garage over the TX#: 7/ Date:               TX#: 8/   Pulleys 4' Pulleys 4' Pulleys 4' Pulleys 4'      Joint mobilization  GH inf/post glide  Grade 3  Joint mobilization  GH inf/post glide  Grade 3  Joint mobilization  GH inf/post glide  Grade 3  Joint mobilization  GH in

## 2019-04-04 ENCOUNTER — OFFICE VISIT (OUTPATIENT)
Dept: PHYSICAL THERAPY | Age: 64
End: 2019-04-04
Attending: PHYSICIAN ASSISTANT
Payer: OTHER MISCELLANEOUS

## 2019-04-04 PROCEDURE — 97110 THERAPEUTIC EXERCISES: CPT

## 2019-04-04 PROCEDURE — 97140 MANUAL THERAPY 1/> REGIONS: CPT

## 2019-04-04 NOTE — PROGRESS NOTES
Dx: s/p RTC repair L         Authorized # of Visits:  12         Next MD visit: none scheduled  Fall Risk: standard         Precautions: no strengthening until 4/22/19             Subjective: Sore in general, but shoulder still making progress.  Haven't tri Pulleys 4' Pulleys 4' Pulleys 4'     Joint mobilization  GH inf/post glide  Grade 3  Joint mobilization  GH inf/post glide  Grade 3  Joint mobilization  GH inf/post glide  Grade 3  Joint mobilization  GH inf/post glide  Grade 3  Joint mobilization  GH inf/

## 2019-04-09 ENCOUNTER — OFFICE VISIT (OUTPATIENT)
Dept: PHYSICAL THERAPY | Age: 64
End: 2019-04-09
Attending: PHYSICIAN ASSISTANT
Payer: OTHER MISCELLANEOUS

## 2019-04-09 PROCEDURE — 97140 MANUAL THERAPY 1/> REGIONS: CPT

## 2019-04-09 PROCEDURE — 97110 THERAPEUTIC EXERCISES: CPT

## 2019-04-09 NOTE — PROGRESS NOTES
Dx: s/p RTC repair L         Authorized # of Visits:  12         Next MD visit: none scheduled  Fall Risk: standard         Precautions: no strengthening until 4/22/19             Subjective: Shoulder making progress with ROM and shoulder less sore in gene Pulleys 4' Pulleys 4' Pulleys 4' Pulleys 4' Pulleys 4' Pulleys 4'    Joint mobilization  GH inf/post glide  Grade 3  Joint mobilization  GH inf/post glide  Grade 3  Joint mobilization  GH inf/post glide  Grade 3  Joint mobilization  GH inf/post glide  Gr

## 2019-04-11 ENCOUNTER — OFFICE VISIT (OUTPATIENT)
Dept: PHYSICAL THERAPY | Age: 64
End: 2019-04-11
Attending: PHYSICIAN ASSISTANT
Payer: OTHER MISCELLANEOUS

## 2019-04-11 PROCEDURE — 97140 MANUAL THERAPY 1/> REGIONS: CPT

## 2019-04-11 PROCEDURE — 97110 THERAPEUTIC EXERCISES: CPT

## 2019-04-11 NOTE — PROGRESS NOTES
Dx: s/p RTC repair L         Authorized # of Visits:  12         Next MD visit: none scheduled  Fall Risk: standard         Precautions: no strengthening until 4/22/19             Subjective: Shoulder a little sore, but nothing unexpected.     Objective: mobilization  [address] inf/post glide  Grade 3  Joint mobilization  GH inf/post glide  Grade 3  Joint mobilization  GH inf/post glide  Grade 3  Joint mobilization  GH inf/post glide  Grade 3  Joint mobilization  GH inf/post glide  Grade 3  Joint mobilization  [address]

## 2019-04-16 ENCOUNTER — OFFICE VISIT (OUTPATIENT)
Dept: PHYSICAL THERAPY | Age: 64
End: 2019-04-16
Attending: ORTHOPAEDIC SURGERY
Payer: OTHER MISCELLANEOUS

## 2019-04-16 PROCEDURE — 97112 NEUROMUSCULAR REEDUCATION: CPT

## 2019-04-16 PROCEDURE — 97140 MANUAL THERAPY 1/> REGIONS: CPT

## 2019-04-16 PROCEDURE — 97110 THERAPEUTIC EXERCISES: CPT

## 2019-04-16 NOTE — PROGRESS NOTES
Dx: s/p RTC repair L         Authorized # of Visits:  12         Next MD visit: none scheduled  Fall Risk: standard         Precautions: no strengthening until 4/22/19             Subjective: Shoulder is moving better, still pops a lot.  As long as I don't Date: 3/26/2019         TX#: 3/12   Date: 3/27/2019       TX#: 4/12 Date: 4/2/2019         TX#: 5/12 Date: 4/4/2019       TX#: 6/12 Date:  4/9/2019        TX#: 7/12 Date: 4/11/2019         TX#: 8/12 Date: 4/16/2019         TX#: 9/12   Pulleys 4' Pulleys 4' and promote healing    Charges: man therapy 1, therapeutic ex 2, neuro re-ed      Total Timed Treatment: 45 min  Total Treatment Time: 45 min

## 2019-04-18 ENCOUNTER — OFFICE VISIT (OUTPATIENT)
Dept: PHYSICAL THERAPY | Age: 64
End: 2019-04-18
Attending: ORTHOPAEDIC SURGERY
Payer: OTHER MISCELLANEOUS

## 2019-04-18 PROCEDURE — 97110 THERAPEUTIC EXERCISES: CPT

## 2019-04-18 PROCEDURE — 97112 NEUROMUSCULAR REEDUCATION: CPT

## 2019-04-18 PROCEDURE — 97140 MANUAL THERAPY 1/> REGIONS: CPT

## 2019-04-18 NOTE — PROGRESS NOTES
Progress Summary    Pt has attended 10, cancelled 0, and no shown 0 visits in Physical Therapy. Subjective: Minimal pain at rest. Pain in L shoulder when I use it around the house. Doesn't feel very strong.     Assessment: Tracy Phelps has full PROM of the and stabilization with ADL such as lifting and reaching-On going  · Pt will be independent and compliant with comprehensive HEP to maintain progress achieved in PT-On going    Rehab Potential: good    Plan: Continue skilled Physical Therapy 2 x/week or an inf/post glide  Grade 3    STM shoulder STM biceps/shoulder STM biceps/shoulder STM biceps/shoulder STM biceps/shoulder STM biceps/shoulder STM biceps/shoulder STM biceps/shoulder STM biceps/shoulder   PROM all planes L shoulder PROM all planes L shoulder

## 2019-04-23 ENCOUNTER — OFFICE VISIT (OUTPATIENT)
Dept: PHYSICAL THERAPY | Age: 64
End: 2019-04-23
Attending: ORTHOPAEDIC SURGERY
Payer: OTHER MISCELLANEOUS

## 2019-04-23 PROCEDURE — 97110 THERAPEUTIC EXERCISES: CPT

## 2019-04-23 PROCEDURE — 97112 NEUROMUSCULAR REEDUCATION: CPT

## 2019-04-23 PROCEDURE — 97140 MANUAL THERAPY 1/> REGIONS: CPT

## 2019-04-23 NOTE — PROGRESS NOTES
Dx: s/p RTC repair L         Authorized # of Visits:  12         Next MD visit: none scheduled  Fall Risk: standard         Precautions: no strengthening until 4/22/19             Subjective: MD happy with progress, said to continue PT, light duty for 4 Date:  4/9/2019        TX#: 7/12 Date: 4/11/2019         TX#: 8/12 Date: 4/16/2019         TX#: 9/12 Date: 4/18/2019         TX#: 10/12 Date: 4/23/2019         TX#: 11/12   Pulleys 4' Pulleys 4' Pulleys 4' Pulleys 4' Pulleys 4' Pulleys 4' Pulleys 4' AAROM 5\"x20ea  Isometric band walk IR/ER 10ea grn Isometric band walk IR/ER 10ea grn Isometric band walk IR/ER 10ea grn Scapular PNF vs MR 20 ea in SL  Rhythmic stabilization 2\" holds and various points of ROM 30x          Scapular PNF vs MR 20 ea in SL Scapul

## 2019-04-26 ENCOUNTER — OFFICE VISIT (OUTPATIENT)
Dept: PHYSICAL THERAPY | Age: 64
End: 2019-04-26
Attending: ORTHOPAEDIC SURGERY
Payer: OTHER MISCELLANEOUS

## 2019-04-26 PROCEDURE — 97140 MANUAL THERAPY 1/> REGIONS: CPT

## 2019-04-26 PROCEDURE — 97112 NEUROMUSCULAR REEDUCATION: CPT

## 2019-04-26 PROCEDURE — 97110 THERAPEUTIC EXERCISES: CPT

## 2019-04-26 NOTE — PROGRESS NOTES
Dx: s/p RTC repair L         Authorized # of Visits:  12         Next MD visit: none scheduled  Fall Risk: standard         Precautions: no strengthening until 4/22/19             Subjective: L shoulder feels better than the R shoulder right now.  L shoul TX#: 8/12 Date: 4/16/2019         TX#: 9/12 Date: 4/18/2019         TX#: 10/12 Date: 4/23/2019         TX#: 11/12 Date: 4/26/2019         TX#: 12/12   Pulleys 4' Pulleys 4' Pulleys 4' Pulleys 4' Pulleys 4' Pulleys 4' AAROM  Pulleys 4'  Wand flex in [address] [place] [address] IR/ER 10ea grn Isometric band walk IR/ER 10ea grn Isometric band walk IR/ER 10ea grn Scapular PNF vs MR 20 ea in SL  Rhythmic stabilization 2\" holds and various points of ROM 30x  Scapular PNF vs MR 20 ea in SL  Rhythmic stabilization 2\" holds and variou

## 2019-04-30 ENCOUNTER — OFFICE VISIT (OUTPATIENT)
Dept: PHYSICAL THERAPY | Age: 64
End: 2019-04-30
Attending: ORTHOPAEDIC SURGERY
Payer: OTHER MISCELLANEOUS

## 2019-04-30 DIAGNOSIS — Z47.89 AFTERCARE FOLLOWING SURGERY OF THE MUSCULOSKELETAL SYSTEM, NEC: ICD-10-CM

## 2019-04-30 PROCEDURE — 97110 THERAPEUTIC EXERCISES: CPT

## 2019-04-30 PROCEDURE — 97140 MANUAL THERAPY 1/> REGIONS: CPT

## 2019-04-30 PROCEDURE — 97112 NEUROMUSCULAR REEDUCATION: CPT

## 2019-04-30 NOTE — PROGRESS NOTES
Dx: s/p RTC repair L         Authorized # of Visits:  12         Next MD visit: none scheduled  Fall Risk: standard         Precautions: no strengthening until 4/22/19             Subjective: L shoulder has been real sore, have trouble raising arm over h 3/21/2019 TX#: 2/12 Date: 4/4/2019       TX#: 6/12 Date:  4/9/2019        TX#: 7/12 Date: 4/11/2019         TX#: 8/12 Date: 4/16/2019         TX#: 9/12 Date: 4/18/2019         TX#: 10/12 Date: 4/23/2019         TX#: 11/12 Date: 4/26/2019         TX#: 12/12 max isometrics  Flex,ext,ER,IR 5\"x20ea Sub max isometrics  Flex,ext,ER,IR 5\"x20ea Sub max isometrics  Flex,ext,ER,IR 5\"x20ea  Isometric band walk IR/ER 10ea grn Isometric band walk IR/ER 10ea grn Isometric band walk IR/ER 10ea grn Scapular PNF vs MR 20

## 2019-05-03 ENCOUNTER — OFFICE VISIT (OUTPATIENT)
Dept: PHYSICAL THERAPY | Age: 64
End: 2019-05-03
Attending: ORTHOPAEDIC SURGERY
Payer: OTHER MISCELLANEOUS

## 2019-05-03 PROCEDURE — 97140 MANUAL THERAPY 1/> REGIONS: CPT

## 2019-05-03 PROCEDURE — 97110 THERAPEUTIC EXERCISES: CPT

## 2019-05-03 NOTE — PROGRESS NOTES
Dx: s/p RTC repair L         Authorized # of Visits:  12         Next MD visit: none scheduled  Fall Risk: standard         Precautions: no strengthening until 4/22/19             Subjective:L shoulder still sore, but moving better.     Objective:   AROM: 11/12 Date: 4/26/2019         TX#: 12/12 Date: 4/30/2019         TX#: 13/30 Date: 5/3/2019         TX#: 14/30   Pulleys 4' Pulleys 4' Pulleys 4' Pulleys 4' AAROM  Pulleys 4'  Wand flex in standing 2x10 AAROM  Pulleys 4'  Wand flex in standing 2x10 1# UBE 4 5\"x20ea  Isometric band walk IR/ER 10ea grn Isometric band walk IR/ER 10ea grn Isometric band walk IR/ER 10ea grn Scapular PNF vs MR 20 ea in SL  Rhythmic stabilization 2\" holds and various points of ROM 30x  Scapular PNF vs MR 20 ea in SL  Rhythmic stab

## 2019-05-07 ENCOUNTER — OFFICE VISIT (OUTPATIENT)
Dept: PHYSICAL THERAPY | Age: 64
End: 2019-05-07
Attending: ORTHOPAEDIC SURGERY
Payer: OTHER MISCELLANEOUS

## 2019-05-07 PROCEDURE — 97110 THERAPEUTIC EXERCISES: CPT

## 2019-05-07 PROCEDURE — 97140 MANUAL THERAPY 1/> REGIONS: CPT

## 2019-05-07 NOTE — PROGRESS NOTES
Dx: s/p RTC repair L         Authorized # of Visits:  12         Next MD visit: none scheduled  Fall Risk: standard         Precautions: no strengthening until 4/22/19             Subjective:Late night last night, didn't get much sleep, shoulder aches.  Tiana Gupta 9/12 Date: 4/18/2019         TX#: 10/12 Date: 4/23/2019         TX#: 11/12 Date: 4/26/2019         TX#: 12/12 Date: 4/30/2019         TX#: 13/30 Date: 5/3/2019         TX#: 14/30 Date: 5/7/2019         TX#: 15/30 Date:    Luz 4' Luz 4' TIFFANIE Crowe band walk IR/ER 10ea grn Scapular PNF vs MR 20 ea in SL  Rhythmic stabilization 2\" holds and various points of ROM 30x  Scapular PNF vs MR 20 ea in SL  Rhythmic stabilization 2\" holds and various points of ROM 30x   tband row red 3x10 Scapular PNF vs MR

## 2019-05-09 ENCOUNTER — APPOINTMENT (OUTPATIENT)
Dept: PHYSICAL THERAPY | Age: 64
End: 2019-05-09
Attending: ORTHOPAEDIC SURGERY
Payer: OTHER MISCELLANEOUS

## 2019-05-09 PROCEDURE — 97110 THERAPEUTIC EXERCISES: CPT

## 2019-05-09 PROCEDURE — 97140 MANUAL THERAPY 1/> REGIONS: CPT

## 2019-05-09 NOTE — PROGRESS NOTES
Dx: s/p RTC repair L         Authorized # of Visits:  27         Next MD visit: none scheduled  Fall Risk: standard         Precautions: no strengthening until 4/22/19             Subjective: Shoulder doing better slowly.  Not doing HEP as much as I shoul Date: 4/18/2019         TX#: 10/12 Date: 4/23/2019         TX#: 11/12 Date: 4/26/2019         TX#: 12/12 Date: 4/30/2019         TX#: 13/30 Date: 5/3/2019         TX#: 14/30 Date: 5/7/2019         TX#: 15/30 Date: 5/9/2019  TX#: 16/30   Pulleys 4' Pulleys 2x10  Prone row 3# 2x10  Prone ext 2#  2x10  Hor abd 1# 2x10  Standing wand flexion 3# 2x10 PREs  scap circles 2# 20cw/ccw  SL ER 2# 2x10  Prone row 3# 2x10  Prone ext 2#  2x10  Hor abd 1# 2x10  Prone LT raise A 2x10  Standing wand flexion 3# 2x10    Isome

## 2019-05-13 ENCOUNTER — OFFICE VISIT (OUTPATIENT)
Dept: PHYSICAL THERAPY | Age: 64
End: 2019-05-13
Attending: ORTHOPAEDIC SURGERY
Payer: OTHER MISCELLANEOUS

## 2019-05-13 PROCEDURE — 97110 THERAPEUTIC EXERCISES: CPT

## 2019-05-13 PROCEDURE — 97140 MANUAL THERAPY 1/> REGIONS: CPT

## 2019-05-13 NOTE — PROGRESS NOTES
Dx: s/p RTC repair L         Authorized # of Visits:  27         Next MD visit: none scheduled  Fall Risk: standard         Precautions: no strengthening until 4/22/19             Subjective: Shoulder feeling better, less pain over the weekend.     Object TX#: 13/30 Date: 5/3/2019         TX#: 14/30 Date: 5/7/2019         TX#: 15/30 Date: 5/9/2019  TX#: 16/30 Date: 5/13/2019  TX#: 17/30   Pulleys 4' AAROM  Pulleys 4'  Wand flex in standing 2x10 AAROM  Pulleys 4'  Wand flex in standing 2x10 1# UBE 4/4 UBE 4/ ext 2#  2x10  Hor abd 1# 2x10  Prone LT raise A 2x10  Standing wand flexion 3# 2x10 PREs  scap circles 2# 20cw/ccw  SL flexion A 2x10  SL ER 2# 2x10  Prone row 3# 2x10  Prone ext 2#  2x10  Hor abd 1# 2x10  Prone LT raise A 2x10  Standing wand flexion 3# 2x

## 2019-05-15 ENCOUNTER — OFFICE VISIT (OUTPATIENT)
Dept: PHYSICAL THERAPY | Age: 64
End: 2019-05-15
Attending: ORTHOPAEDIC SURGERY
Payer: OTHER MISCELLANEOUS

## 2019-05-15 PROCEDURE — 97110 THERAPEUTIC EXERCISES: CPT

## 2019-05-15 PROCEDURE — 97140 MANUAL THERAPY 1/> REGIONS: CPT

## 2019-05-15 NOTE — PROGRESS NOTES
Dx: s/p RTC repair L         Authorized # of Visits:  27         Next MD visit: none scheduled  Fall Risk: standard         Precautions: no strengthening until 4/22/19             Subjective: Did some yard work yesterday, everything except my shoulder hu TX#: 14/30 Date: 5/7/2019         TX#: 15/30 Date: 5/9/2019  TX#: 16/30 Date: 5/13/2019  TX#: 17/30 Date: 5/15/2019  TX#: 18/30   Pulleys 4' AAROM  Pulleys 4'  Wand flex in standing 2x10 1# UBE 4/4 UBE 4/4 UBE 4/4 UBE 4/4 L3 UBE 4/4 L3 UBE 4/4 L3 UBE 4/4 L 20cw/ccw  SL flexion A 2x10  SL ER 2# 2x10  Prone row 3# 2x10  Prone ext 2#  2x10  Hor abd 1# 2x10  Prone LT raise A 2x10  Standing wand flexion 3# 2x10 PREs  scap circles 2# 20cw/ccw  SL flexion A 2x10  SL ER 3# 2x10  Prone row 3# 2x10  Prone ext 2#  2x10

## 2019-05-21 ENCOUNTER — OFFICE VISIT (OUTPATIENT)
Dept: PHYSICAL THERAPY | Age: 64
End: 2019-05-21
Attending: ORTHOPAEDIC SURGERY
Payer: OTHER MISCELLANEOUS

## 2019-05-21 PROCEDURE — 97110 THERAPEUTIC EXERCISES: CPT

## 2019-05-21 PROCEDURE — 97140 MANUAL THERAPY 1/> REGIONS: CPT

## 2019-05-21 NOTE — PROGRESS NOTES
Progress  Summary    Pt has attended 19, cancelled 0, and no shown 0 visits in Physical Therapy. Subjective: Shoulder continues to gradually better, feel less pain. Strength still not there. Assessment: Ca Parks has full PROM.   AROM full in [place] return to full work duty without restriction  · Pt will be independent and compliant with comprehensive HEP to maintain progress achieved in PT    Rehab Potential: good    Plan: Continue skilled Physical Therapy 2 x/week for an additional 16 visits over a all planes L shoulder, forearm  pec stretch 3' PROM all planes L shoulder, forearm  pec stretch 3' PROM all planes L shoulder, forearm  pec stretch 3' PROM all planes L shoulder, forearm  pec stretch 3' PROM all planes L shoulder, forearm  pec stretch 3' P grn 2x10 Tband IR  Red 2x10  tband ER red 2x10  tband row grn 2x10 Tband IR  Red 2x10  tband ER red 2x10  tband row grn 2x10   Wand flex in standing 2x10 1# Tband IR  Red 2x10  tband ER yel 2x10  SB wall walks 2x10 SB wall walks 2x10 SB wall walks 2x10 SB

## 2019-05-23 ENCOUNTER — OFFICE VISIT (OUTPATIENT)
Dept: PHYSICAL THERAPY | Age: 64
End: 2019-05-23
Attending: ORTHOPAEDIC SURGERY
Payer: OTHER MISCELLANEOUS

## 2019-05-23 PROCEDURE — 97110 THERAPEUTIC EXERCISES: CPT

## 2019-05-23 PROCEDURE — 97140 MANUAL THERAPY 1/> REGIONS: CPT

## 2019-05-23 NOTE — PROGRESS NOTES
Dx: L shoulder RTC repair         Authorized # of Visits:  [phone]         Next MD visit: none scheduled  Fall Risk: standard         Precautions: n/a             Subjective: MD happy with progress. Released to full duty, return to driving tonight.  Gave me inje Date: 5/21/2019  TX#: 19/36 Date: 5/23/2019  TX#: 20/36   UBE 4/4 UBE 4/4 UBE 4/4 L3 UBE 4/4 L3 UBE 4/4 L3 UBE 4/4 L4 UBE 4/4 L4 UBE 4/4 L5   Joint mobilization  GH inf/post glide  Grade 3  Joint mobilization  GH inf/post glide  Grade 3   Joint mobilizatio 2x10  Prone row 3# 2x10  Prone ext 2#  2x10  Hor abd 1# 2x10  Prone LT raise A 2x10  Standing wand flexion 3# 2x10 PREs  scap circles 2# 20cw/ccw  SL flexion 1# 2x10  SL ER 3# 2x10  Prone row 4# 2x10  Prone ext 3#  2x10  Hor abd 2# 2x10  Prone LT raise A 2

## 2019-05-28 ENCOUNTER — APPOINTMENT (OUTPATIENT)
Dept: PHYSICAL THERAPY | Age: 64
End: 2019-05-28
Attending: ORTHOPAEDIC SURGERY
Payer: OTHER MISCELLANEOUS

## 2019-05-28 PROCEDURE — 97140 MANUAL THERAPY 1/> REGIONS: CPT

## 2019-05-28 PROCEDURE — 97110 THERAPEUTIC EXERCISES: CPT

## 2019-05-28 NOTE — PROGRESS NOTES
Dx: L shoulder RTC repair         Authorized # of Visits:  [phone]         Next MD visit: none scheduled  Fall Risk: standard         Precautions: n/a             Subjective: Shoulder still feels better after shot. Drive for first time tonight.     Objective: 5/28/2019  TX#: 21/36   UBE 4/4 UBE 4/4 L3 UBE 4/4 L3 UBE 4/4 L3 UBE 4/4 L4 UBE 4/4 L4 UBE 4/4 L5 UBE 4/4 L5   Joint mobilization  GH inf/post glide  Grade 3   Joint mobilization  GH inf/post glide  Grade 3  Joint mobilization  GH inf/post glide  Grade 3 20cw/ccw  SL flexion 1# 2x10  SL ER 3# 2x10  Prone row 4# 2x10  Prone ext 3#  2x10  Hor abd 2# 2x10  Prone LT raise A 2x10  Standing wand flexion 3# 2x10 SL flexion 1# 2x10  SL ER 3# 2x10  Prone row 4# 2x10  Prone ext 3#  2x10  Hor abd 2# 2x10  Prone LT ra

## 2019-05-30 ENCOUNTER — OFFICE VISIT (OUTPATIENT)
Dept: PHYSICAL THERAPY | Age: 64
End: 2019-05-30
Attending: ORTHOPAEDIC SURGERY
Payer: OTHER MISCELLANEOUS

## 2019-05-30 PROCEDURE — 97110 THERAPEUTIC EXERCISES: CPT

## 2019-05-30 PROCEDURE — 97140 MANUAL THERAPY 1/> REGIONS: CPT

## 2019-05-30 NOTE — PROGRESS NOTES
Dx: L shoulder RTC repair         Authorized # of Visits:  [phone]         Next MD visit: none scheduled  Fall Risk: standard         Precautions: n/a             Subjective: Did first OTR drive, shoulder felt fine.      Objective:     AROM:   Shoulder    Flexio 5/15/2019  TX#: 18/30 Date: 5/21/2019  TX#: 19/36 Date: 5/23/2019  TX#: 20/36 Date: 5/28/2019  TX#: 21/36 Date: 5/30/2019  TX#: 22/36   UBE 4/4 L3 UBE 4/4 L3 UBE 4/4 L3 UBE 4/4 L4 UBE 4/4 L4 UBE 4/4 L5 UBE 4/4 L5 UBE 4/4 L5    Joint mobilization  GH inf/po circles 2# 20cw/ccw  SL flexion 1# 2x10  SL ER 3# 2x10  Prone row 4# 2x10  Prone ext 3#  2x10  Hor abd 2# 2x10  Prone LT raise A 2x10  Standing wand flexion 3# 2x10 SL flexion 1# 2x10  SL ER 3# 2x10  Prone row 4# 2x10  Prone ext 3#  2x10  Hor abd 2# 2x10

## 2019-06-14 ENCOUNTER — OFFICE VISIT (OUTPATIENT)
Dept: PHYSICAL THERAPY | Age: 64
End: 2019-06-14
Attending: FAMILY MEDICINE
Payer: OTHER MISCELLANEOUS

## 2019-06-14 PROCEDURE — 97140 MANUAL THERAPY 1/> REGIONS: CPT

## 2019-06-14 PROCEDURE — 97110 THERAPEUTIC EXERCISES: CPT

## 2019-06-14 NOTE — PROGRESS NOTES
Dx: L shoulder RTC repair         Authorized # of Visits:  [phone]         Next MD visit: none scheduled  Fall Risk: standard         Precautions: n/a             Subjective: Drove 3000 miles this week.  Shoulder feels pretty good, hip limits me more than anythi 20/36 Date: 5/28/2019  TX#: 21/36 Date: 5/30/2019  TX#: 22/36 Date: 6/14/2019  TX#: 23/36   UBE 4/4 L3 UBE 4/4 L3 UBE 4/4 L3 UBE 4/4 L4 UBE 4/4 L4 UBE 4/4 L5 UBE 4/4 L5 UBE 4/4 L5 UBE 4/4 L5    Joint mobilization  GH inf/post glide  Grade 3  Joint mobiliza 2x10  Hor abd 1# 2x10  Prone LT raise A 2x10  Standing wand flexion 3# 2x10 PREs  scap circles 2# 20cw/ccw  SL flexion 1# 2x10  SL ER 3# 2x10  Prone row 4# 2x10  Prone ext 3#  2x10  Hor abd 2# 2x10  Prone LT raise A 2x10  Standing wand flexion 3# 2x10 SL f

## 2019-06-18 ENCOUNTER — APPOINTMENT (OUTPATIENT)
Dept: PHYSICAL THERAPY | Age: 64
End: 2019-06-18
Payer: COMMERCIAL

## 2019-06-21 ENCOUNTER — OFFICE VISIT (OUTPATIENT)
Dept: PHYSICAL THERAPY | Age: 64
End: 2019-06-21
Attending: FAMILY MEDICINE
Payer: OTHER MISCELLANEOUS

## 2019-06-21 PROCEDURE — 97110 THERAPEUTIC EXERCISES: CPT

## 2019-06-21 PROCEDURE — 97140 MANUAL THERAPY 1/> REGIONS: CPT

## 2019-06-21 NOTE — PROGRESS NOTES
Dx: L shoulder RTC repair         Authorized # of Visits:  [phone]         Next MD visit: none scheduled  Fall Risk: standard         Precautions: n/a             Subjective: Shoulder feels good, other parts of my body giving me trouble than shoulder.     Object 5/23/2019  TX#: 20/36 Date: 5/28/2019  TX#: 21/36 Date: 5/30/2019  TX#: 22/36 Date: 6/14/2019  TX#: 23/36 Date: 6/21/2019  TX#: 24/36   UBE 4/4 L3 UBE 4/4 L3 UBE 4/4 L4 UBE 4/4 L4 UBE 4/4 L5 UBE 4/4 L5 UBE 4/4 L5 UBE 4/4 L5 UBE 4/4 L5   Joint mobilization 2x10  Prone LT raise A 2x10  Standing wand flexion 3# 2x10 PREs  scap circles 2# 20cw/ccw  SL flexion 1# 2x10  SL ER 3# 2x10  Prone row 4# 2x10  Prone ext 3#  2x10  Hor abd 2# 2x10  Prone LT raise A 2x10  Standing wand flexion 3# 2x10 SL flexion 1# 2x10  S min  Total Treatment Time: 45 min

## 2019-06-25 ENCOUNTER — APPOINTMENT (OUTPATIENT)
Dept: PHYSICAL THERAPY | Age: 64
End: 2019-06-25
Payer: COMMERCIAL

## 2019-06-26 ENCOUNTER — TELEPHONE (OUTPATIENT)
Dept: PHYSICAL THERAPY | Age: 64
End: 2019-06-26

## 2019-06-26 ENCOUNTER — APPOINTMENT (OUTPATIENT)
Dept: PHYSICAL THERAPY | Age: 64
End: 2019-06-26
Attending: FAMILY MEDICINE
Payer: OTHER MISCELLANEOUS

## 2019-06-28 ENCOUNTER — OFFICE VISIT (OUTPATIENT)
Dept: PHYSICAL THERAPY | Age: 64
End: 2019-06-28
Attending: FAMILY MEDICINE
Payer: OTHER MISCELLANEOUS

## 2019-06-28 PROCEDURE — 97110 THERAPEUTIC EXERCISES: CPT

## 2019-06-28 PROCEDURE — 97112 NEUROMUSCULAR REEDUCATION: CPT

## 2019-06-28 PROCEDURE — 97140 MANUAL THERAPY 1/> REGIONS: CPT

## 2019-06-28 NOTE — PROGRESS NOTES
Dx: L shoulder RTC repair         Authorized # of Visits:  [phone]         Next MD visit: none scheduled  Fall Risk: standard         Precautions: n/a             Subjective: Shoulder continues to feel good, overshadowed by hip pain.  Strength continues to get b 5/21/2019  TX#: 19/36 Date: 5/23/2019  TX#: 20/36 Date: 5/28/2019  TX#: 21/36 Date: 5/30/2019  TX#: 22/36 Date: 6/14/2019  TX#: 23/36 Date: 6/21/2019  TX#: 24/36 Date: 6/28/2019  TX#: 24/36   UBE 4/4 L3 UBE 4/4 L4 UBE 4/4 L4 UBE 4/4 L5 UBE 4/4 L5 UBE 4/4 L 1# 2x10  SL ER 3# 2x10  Prone row 4# 2x10  Prone ext 3#  2x10  Hor abd 2# 2x10  Prone LT raise A 2x10  Standing wand flexion 3# 2x10 SL flexion 1# 2x10  SL ER 3# 2x10  Prone row 4# 2x10  Prone ext 3#  2x10  Hor abd 2# 2x10  Prone LT raise A 2x10  Standing pain and promote healing. Cuing rendered to avoid substitution.     Charges: man therapy 1, therapeutic ex 2, neuro re-ed  Total Timed Treatment: 45 min  Total Treatment Time: 53 min

## 2019-07-02 ENCOUNTER — OFFICE VISIT (OUTPATIENT)
Dept: PHYSICAL THERAPY | Age: 64
End: 2019-07-02
Attending: FAMILY MEDICINE
Payer: OTHER MISCELLANEOUS

## 2019-07-02 PROCEDURE — 97112 NEUROMUSCULAR REEDUCATION: CPT

## 2019-07-02 PROCEDURE — 97140 MANUAL THERAPY 1/> REGIONS: CPT

## 2019-07-02 PROCEDURE — 97110 THERAPEUTIC EXERCISES: CPT

## 2019-07-02 NOTE — PROGRESS NOTES
Dx: L shoulder RTC repair         Authorized # of Visits:  39         Next MD visit: none scheduled  Fall Risk: standard         Precautions: n/a             Subjective: Shoulder feels great, no real limitations in ROM or pain.  Strength gradually getting b 24/36 Date: 6/28/2019  TX#: 25/36 Date: 7/2/2019  TX#: 26/36   UBE 4/4 L4 UBE 4/4 L4 UBE 4/4 L5 UBE 4/4 L5 UBE 4/4 L5 UBE 4/4 L5 UBE 4/4 L5 UBE 4/4 L5 UBE 4/4   Joint mobilization  GH inf/post glide  Grade 3  Joint mobilization  GH inf/post glide  Grade 3 flexion 1# 2x10  SL ER 3# 2x10  Prone row 4# 2x10  Prone ext 3#  2x10  Hor abd 2# 2x10  Prone LT raise A 2x10  Standing standing flexion 2x10  Biceps curl 5# 2x10   SL flexion 1# 2x10  SL ER 3# 2x10  Prone row 4# 2x10  Prone ext 3#  2x10  Hor abd 2# 2x10 therapy 1, therapeutic ex 2, neuro re-ed  Total Timed Treatment: 45 min  Total Treatment Time: 53 min

## 2019-07-05 ENCOUNTER — OFFICE VISIT (OUTPATIENT)
Dept: PHYSICAL THERAPY | Age: 64
End: 2019-07-05
Attending: FAMILY MEDICINE
Payer: OTHER MISCELLANEOUS

## 2019-07-05 NOTE — PROGRESS NOTES
Discharge Summary    Pt has attended 26 visits in Physical Therapy. Subjective: No complaints in regards to shoulder, happy with progress. Have returned to full work duty without any problems as fas as shoulder goes.  Hip pain limits me at work, not sh progress achieved in PT-Met      Rehab Potential: good    Plan: Discharge to [facility], patient has returned to full work duty.        Patient/Family/Caregiver was advised of these findings, precautions, and treatment options and has agreed to actively participat

## 2019-07-09 ENCOUNTER — APPOINTMENT (OUTPATIENT)
Dept: PHYSICAL THERAPY | Age: 64
End: 2019-07-09
Payer: OTHER MISCELLANEOUS

## 2019-07-12 ENCOUNTER — APPOINTMENT (OUTPATIENT)
Dept: PHYSICAL THERAPY | Age: 64
End: 2019-07-12
Payer: OTHER MISCELLANEOUS

## 2019-07-16 ENCOUNTER — APPOINTMENT (OUTPATIENT)
Dept: PHYSICAL THERAPY | Age: 64
End: 2019-07-16
Payer: OTHER MISCELLANEOUS

## 2019-07-19 ENCOUNTER — APPOINTMENT (OUTPATIENT)
Dept: PHYSICAL THERAPY | Age: 64
End: 2019-07-19
Payer: OTHER MISCELLANEOUS

## 2019-08-23 ENCOUNTER — TELEPHONE (OUTPATIENT)
Dept: FAMILY MEDICINE CLINIC | Facility: CLINIC | Age: 64
End: 2019-08-23

## 2019-08-23 RX ORDER — MULTIVIT-MINERALS/FA/LYCOPENE 0.4 MG-6
1 TABLET ORAL DAILY
COMMUNITY

## 2019-08-23 NOTE — TELEPHONE ENCOUNTER
Appointment scheduled.   Future Appointments   Date Time Provider Les Anderson   9/7/2019 11:15 AM Sheridan Contreras., DO [facility] [place]   9/18/2019  2:30 PM Judd Brunner, MD [facility]   10/1/2019 10:00 AM Judd Brunner, MD Donny Rayas

## 2019-09-06 RX ORDER — LISINOPRIL 10 MG/1
TABLET ORAL
Qty: 90 TABLET | Refills: 0 | Status: ON HOLD | OUTPATIENT
Start: 2019-09-06 | End: 2019-09-18

## 2019-09-06 NOTE — TELEPHONE ENCOUNTER
Last office visit 1/22/19 /84  Last refill: 2/21/19  Last labs: 1/22/19  Future Appointments   Date Time Provider Les Monica   9/7/2019 11:15 AM Marisabel Robertson,  [facility] [place]   9/18/2019 12:15 PM Alexandra Barger MD [id number]  [facility]

## 2019-09-07 ENCOUNTER — OFFICE VISIT (OUTPATIENT)
Dept: FAMILY MEDICINE CLINIC | Facility: CLINIC | Age: 64
End: 2019-09-07
Payer: OTHER MISCELLANEOUS

## 2019-09-07 VITALS
HEART RATE: 70 BPM | HEIGHT: 70 IN | DIASTOLIC BLOOD PRESSURE: 80 MMHG | OXYGEN SATURATION: 98 % | TEMPERATURE: 98 F | WEIGHT: 222 LBS | BODY MASS INDEX: 31.78 KG/M2 | SYSTOLIC BLOOD PRESSURE: 126 MMHG

## 2019-09-07 DIAGNOSIS — Z01.818 PRE-OP EVALUATION: Primary | ICD-10-CM

## 2019-09-07 DIAGNOSIS — Z99.89 OSA ON CPAP: ICD-10-CM

## 2019-09-07 DIAGNOSIS — Z96.652 STATUS POST LEFT KNEE REPLACEMENT: ICD-10-CM

## 2019-09-07 DIAGNOSIS — M16.12 PRIMARY OSTEOARTHRITIS OF LEFT HIP: ICD-10-CM

## 2019-09-07 DIAGNOSIS — G47.33 OSA ON CPAP: ICD-10-CM

## 2019-09-07 DIAGNOSIS — I10 ESSENTIAL HYPERTENSION: ICD-10-CM

## 2019-09-07 DIAGNOSIS — K21.9 GASTROESOPHAGEAL REFLUX DISEASE WITHOUT ESOPHAGITIS: ICD-10-CM

## 2019-09-07 LAB
ALBUMIN SERPL-MCNC: 4.2 G/DL (ref 3.4–5)
ALBUMIN/GLOB SERPL: 1.4 {RATIO} (ref 1–2)
ALP LIVER SERPL-CCNC: 110 U/L (ref 45–117)
ALT SERPL-CCNC: 27 U/L (ref 16–61)
ANION GAP SERPL CALC-SCNC: 7 MMOL/L (ref 0–18)
AST SERPL-CCNC: 18 U/L (ref 15–37)
BASOPHILS # BLD AUTO: 0.03 X10(3) UL (ref 0–0.2)
BASOPHILS NFR BLD AUTO: 0.4 %
BILIRUB SERPL-MCNC: 0.5 MG/DL (ref 0.1–2)
BILIRUB UR QL STRIP.AUTO: NEGATIVE
BUN BLD-MCNC: 13 MG/DL (ref 7–18)
BUN/CREAT SERPL: 13.7 (ref 10–20)
CALCIUM BLD-MCNC: 9.7 MG/DL (ref 8.5–10.1)
CHLORIDE SERPL-SCNC: 108 MMOL/L (ref 98–112)
CLARITY UR REFRACT.AUTO: CLEAR
CO2 SERPL-SCNC: 23 MMOL/L (ref 21–32)
COLOR UR AUTO: YELLOW
CREAT BLD-MCNC: 0.95 MG/DL (ref 0.7–1.3)
DEPRECATED RDW RBC AUTO: 47.8 FL (ref 35.1–46.3)
EOSINOPHIL # BLD AUTO: 0.37 X10(3) UL (ref 0–0.7)
EOSINOPHIL NFR BLD AUTO: 5.5 %
ERYTHROCYTE [DISTWIDTH] IN BLOOD BY AUTOMATED COUNT: 13.6 % (ref 11–15)
GLOBULIN PLAS-MCNC: 3 G/DL (ref 2.8–4.4)
GLUCOSE BLD-MCNC: 114 MG/DL (ref 70–99)
GLUCOSE UR STRIP.AUTO-MCNC: NEGATIVE MG/DL
HCT VFR BLD AUTO: 46 % (ref 39–53)
HGB BLD-MCNC: 15.4 G/DL (ref 13–17.5)
IMM GRANULOCYTES # BLD AUTO: 0.01 X10(3) UL (ref 0–1)
IMM GRANULOCYTES NFR BLD: 0.1 %
KETONES UR STRIP.AUTO-MCNC: NEGATIVE MG/DL
LEUKOCYTE ESTERASE UR QL STRIP.AUTO: NEGATIVE
LYMPHOCYTES # BLD AUTO: 1.32 X10(3) UL (ref 1–4)
LYMPHOCYTES NFR BLD AUTO: 19.6 %
M PROTEIN MFR SERPL ELPH: 7.2 G/DL (ref 6.4–8.2)
MCH RBC QN AUTO: 31.6 PG (ref 26–34)
MCHC RBC AUTO-ENTMCNC: 33.5 G/DL (ref 31–37)
MCV RBC AUTO: 94.5 FL (ref 80–100)
MONOCYTES # BLD AUTO: 0.76 X10(3) UL (ref 0.1–1)
MONOCYTES NFR BLD AUTO: 11.3 %
NEUTROPHILS # BLD AUTO: 4.26 X10 (3) UL (ref 1.5–7.7)
NEUTROPHILS # BLD AUTO: 4.26 X10(3) UL (ref 1.5–7.7)
NEUTROPHILS NFR BLD AUTO: 63.1 %
NITRITE UR QL STRIP.AUTO: NEGATIVE
OSMOLALITY SERPL CALC.SUM OF ELEC: 287 MOSM/KG (ref 275–295)
PH UR STRIP.AUTO: 5 [PH] (ref 4.5–8)
PLATELET # BLD AUTO: 166 10(3)UL (ref 150–450)
POTASSIUM SERPL-SCNC: 4.5 MMOL/L (ref 3.5–5.1)
PROT UR STRIP.AUTO-MCNC: 30 MG/DL
RBC # BLD AUTO: 4.87 X10(6)UL (ref 4.3–5.7)
RBC UR QL AUTO: NEGATIVE
SODIUM SERPL-SCNC: 138 MMOL/L (ref 136–145)
SP GR UR STRIP.AUTO: 1.02 (ref 1–1.03)
UROBILINOGEN UR STRIP.AUTO-MCNC: <2 MG/DL
WBC # BLD AUTO: 6.8 X10(3) UL (ref 4–11)

## 2019-09-07 PROCEDURE — 99243 OFF/OP CNSLTJ NEW/EST LOW 30: CPT | Performed by: FAMILY MEDICINE

## 2019-09-07 PROCEDURE — 87081 CULTURE SCREEN ONLY: CPT | Performed by: FAMILY MEDICINE

## 2019-09-07 PROCEDURE — 93000 ELECTROCARDIOGRAM COMPLETE: CPT | Performed by: FAMILY MEDICINE

## 2019-09-07 PROCEDURE — 81001 URINALYSIS AUTO W/SCOPE: CPT | Performed by: FAMILY MEDICINE

## 2019-09-07 PROCEDURE — 85610 PROTHROMBIN TIME: CPT | Performed by: FAMILY MEDICINE

## 2019-09-07 PROCEDURE — 85025 COMPLETE CBC W/AUTO DIFF WBC: CPT | Performed by: FAMILY MEDICINE

## 2019-09-07 PROCEDURE — 85730 THROMBOPLASTIN TIME PARTIAL: CPT | Performed by: FAMILY MEDICINE

## 2019-09-07 PROCEDURE — 36415 COLL VENOUS BLD VENIPUNCTURE: CPT | Performed by: FAMILY MEDICINE

## 2019-09-07 PROCEDURE — 80053 COMPREHEN METABOLIC PANEL: CPT | Performed by: FAMILY MEDICINE

## 2019-09-07 RX ORDER — HYDROCODONE BITARTRATE AND ACETAMINOPHEN 5; 325 MG/1; MG/1
1 TABLET ORAL NIGHTLY PRN
Qty: 10 TABLET | Refills: 0 | Status: ON HOLD | OUTPATIENT
Start: 2019-09-07 | End: 2019-09-18

## 2019-09-07 NOTE — H&P
Lennox Pina is a 59year old male who presents for a preoperative consultation as requested by Celia Mcintosh.   HPI:   Pt is scheduled for left total hip arthroplasty on 9/18/2019 at [facility].  Pt reports surgery was approved due to a work-relate SHOULDER WITH ROTATOR CUFF REPAIR Left 1/28/2019    Performed by Rudy Vilallta MD at [address]  01/02/2015    microdiscectomy L2 in [place]   • COLONOSCOPY  11/22/2010    @ [facility], tubular adenoma   • COLONOSCOPY  04/09/2 hesitancy,nocturia, decreased urine stream, incontinence, erectile dysfunction, decreased libido   MUSCULOSKELETAL: denies back pain, +left hip pain- using a cane   NEURO: denies headaches, tremors, dizziness, and weakness, chronic numbness and anterior as normal EKG  ASSESSMENT AND PLAN:   Ella Le is a 59year old male   Pre-op evaluation  (primary encounter diagnosis)  Primary osteoarthritis of left hip  Essential hypertension  Regulo on cpap  Status post left knee replacement  Gastroesophageal refl

## 2019-09-09 ENCOUNTER — TELEPHONE (OUTPATIENT)
Dept: FAMILY MEDICINE CLINIC | Facility: CLINIC | Age: 64
End: 2019-09-09

## 2019-09-09 LAB
APTT PPP: 31.4 SECONDS (ref 25.4–36.1)
INR BLD: 1 (ref 0.9–1.1)
PSA SERPL DL<=0.01 NG/ML-MCNC: 13.6 SECONDS (ref 12.5–14.7)

## 2019-09-12 ENCOUNTER — MED REC SCAN ONLY (OUTPATIENT)
Dept: FAMILY MEDICINE CLINIC | Facility: CLINIC | Age: 64
End: 2019-09-12

## 2019-09-17 NOTE — H&P
[address] [place] Patient Status:  Surgery Admit - Inpt    1955 MRN [id number]   [facility] SURGERY Attending Neil Riggs MD   Hosp Day # 0 PCP Audree Gaucher.  Valeria Arango DO     Date of Admission: Medications:    No medications prior to admission. Physical Exam:   General: Alert, orientated x3. Cooperative. No apparent distress. Vital Signs:  Height 6' (1.829 m), weight 220 lb (99.8 kg). HEENT: Exam is unremarkable.     Neck: No tenderness to

## 2019-09-18 ENCOUNTER — ANESTHESIA EVENT (OUTPATIENT)
Dept: SURGERY | Facility: HOSPITAL | Age: 64
DRG: 470 | End: 2019-09-18
Payer: OTHER MISCELLANEOUS

## 2019-09-18 ENCOUNTER — HOSPITAL ENCOUNTER (INPATIENT)
Facility: HOSPITAL | Age: 64
LOS: 1 days | Discharge: HOME HEALTH | DRG: 470 | End: 2019-09-19
Attending: ORTHOPAEDIC SURGERY | Admitting: ORTHOPAEDIC SURGERY
Payer: OTHER MISCELLANEOUS

## 2019-09-18 ENCOUNTER — ANESTHESIA (OUTPATIENT)
Dept: SURGERY | Facility: HOSPITAL | Age: 64
DRG: 470 | End: 2019-09-18
Payer: OTHER MISCELLANEOUS

## 2019-09-18 ENCOUNTER — APPOINTMENT (OUTPATIENT)
Dept: GENERAL RADIOLOGY | Facility: HOSPITAL | Age: 64
DRG: 470 | End: 2019-09-18
Attending: ORTHOPAEDIC SURGERY
Payer: OTHER MISCELLANEOUS

## 2019-09-18 DIAGNOSIS — M16.12 PRIMARY OSTEOARTHRITIS OF LEFT HIP: ICD-10-CM

## 2019-09-18 LAB
ANTIBODY SCREEN: NEGATIVE
RH BLOOD TYPE: POSITIVE

## 2019-09-18 PROCEDURE — 73501 X-RAY EXAM HIP UNI 1 VIEW: CPT | Performed by: ORTHOPAEDIC SURGERY

## 2019-09-18 PROCEDURE — 0SRB0JA REPLACEMENT OF LEFT HIP JOINT WITH SYNTHETIC SUBSTITUTE, UNCEMENTED, OPEN APPROACH: ICD-10-PCS | Performed by: ORTHOPAEDIC SURGERY

## 2019-09-18 PROCEDURE — 99233 SBSQ HOSP IP/OBS HIGH 50: CPT | Performed by: HOSPITALIST

## 2019-09-18 PROCEDURE — 3E0T3BZ INTRODUCTION OF ANESTHETIC AGENT INTO PERIPHERAL NERVES AND PLEXI, PERCUTANEOUS APPROACH: ICD-10-PCS | Performed by: ANESTHESIOLOGY

## 2019-09-18 DEVICE — BIOLOX DELTA CERAMIC FEMORAL HEAD +8.5 36MM DIA 12/14 TAPER
Type: IMPLANTABLE DEVICE | Site: HIP | Status: FUNCTIONAL
Brand: BIOLOX DELTA

## 2019-09-18 DEVICE — PINNACLE POROCOAT ACETABULAR SHELL SECTOR II 58MM OD
Type: IMPLANTABLE DEVICE | Site: HIP | Status: FUNCTIONAL
Brand: PINNACLE POROCOAT

## 2019-09-18 DEVICE — PINNACLE CANCELLOUS BONE SCREW 6.5MM X 25MM
Type: IMPLANTABLE DEVICE | Site: HIP | Status: FUNCTIONAL
Brand: PINNACLE

## 2019-09-18 DEVICE — APEX HOLE ELIMINATOR - PS
Type: IMPLANTABLE DEVICE | Site: HIP | Status: FUNCTIONAL
Brand: APEX

## 2019-09-18 DEVICE — CORAIL HIP SYSTEM CEMENTLESS FEMORAL STEM 12/14 AMT 135 DEGREES KHO SIZE 11 HA COATED HIGH OFFSET NO COLLAR
Type: IMPLANTABLE DEVICE | Site: HIP | Status: FUNCTIONAL
Brand: CORAIL

## 2019-09-18 DEVICE — PINNACLE HIP SOLUTIONS ALTRX POLYETHYLENE ACETABULAR LINER NEUTRAL 36MM ID 58MM OD
Type: IMPLANTABLE DEVICE | Site: HIP | Status: FUNCTIONAL
Brand: PINNACLE ALTRX

## 2019-09-18 RX ORDER — CEFAZOLIN SODIUM/WATER 2 G/20 ML
2 SYRINGE (ML) INTRAVENOUS ONCE
Status: COMPLETED | OUTPATIENT
Start: 2019-09-18 | End: 2019-09-18

## 2019-09-18 RX ORDER — CEFAZOLIN SODIUM/WATER 2 G/20 ML
2 SYRINGE (ML) INTRAVENOUS EVERY 8 HOURS
Status: COMPLETED | OUTPATIENT
Start: 2019-09-18 | End: 2019-09-19

## 2019-09-18 RX ORDER — BISACODYL 10 MG
10 SUPPOSITORY, RECTAL RECTAL
Status: DISCONTINUED | OUTPATIENT
Start: 2019-09-18 | End: 2019-09-19

## 2019-09-18 RX ORDER — OXYCODONE HYDROCHLORIDE 15 MG/1
15 TABLET ORAL EVERY 4 HOURS PRN
Status: DISCONTINUED | OUTPATIENT
Start: 2019-09-18 | End: 2019-09-19

## 2019-09-18 RX ORDER — KETOROLAC TROMETHAMINE 30 MG/ML
30 INJECTION, SOLUTION INTRAMUSCULAR; INTRAVENOUS EVERY 6 HOURS
Status: COMPLETED | OUTPATIENT
Start: 2019-09-18 | End: 2019-09-19

## 2019-09-18 RX ORDER — ZOLPIDEM TARTRATE 5 MG/1
5 TABLET ORAL NIGHTLY PRN
Status: DISCONTINUED | OUTPATIENT
Start: 2019-09-18 | End: 2019-09-19

## 2019-09-18 RX ORDER — ACETAMINOPHEN 325 MG/1
650 TABLET ORAL 4 TIMES DAILY
Status: DISCONTINUED | OUTPATIENT
Start: 2019-09-18 | End: 2019-09-19

## 2019-09-18 RX ORDER — HYDROMORPHONE HYDROCHLORIDE 1 MG/ML
0.8 INJECTION, SOLUTION INTRAMUSCULAR; INTRAVENOUS; SUBCUTANEOUS EVERY 2 HOUR PRN
Status: DISCONTINUED | OUTPATIENT
Start: 2019-09-18 | End: 2019-09-19

## 2019-09-18 RX ORDER — PROCHLORPERAZINE EDISYLATE 5 MG/ML
10 INJECTION INTRAMUSCULAR; INTRAVENOUS EVERY 6 HOURS PRN
Status: DISCONTINUED | OUTPATIENT
Start: 2019-09-18 | End: 2019-09-19

## 2019-09-18 RX ORDER — OXYCODONE HYDROCHLORIDE 10 MG/1
10 TABLET ORAL EVERY 4 HOURS PRN
Status: DISCONTINUED | OUTPATIENT
Start: 2019-09-18 | End: 2019-09-19

## 2019-09-18 RX ORDER — NALOXONE HYDROCHLORIDE 0.4 MG/ML
80 INJECTION, SOLUTION INTRAMUSCULAR; INTRAVENOUS; SUBCUTANEOUS AS NEEDED
Status: DISCONTINUED | OUTPATIENT
Start: 2019-09-18 | End: 2019-09-18 | Stop reason: HOSPADM

## 2019-09-18 RX ORDER — TIZANIDINE 4 MG/1
4 TABLET ORAL 3 TIMES DAILY PRN
Status: DISCONTINUED | OUTPATIENT
Start: 2019-09-18 | End: 2019-09-19

## 2019-09-18 RX ORDER — ASPIRIN 325 MG
325 TABLET, DELAYED RELEASE (ENTERIC COATED) ORAL EVERY 6 HOURS PRN
Qty: 56 TABLET | Refills: 0 | Status: SHIPPED | OUTPATIENT
Start: 2019-09-18 | End: 2019-09-19

## 2019-09-18 RX ORDER — DIPHENHYDRAMINE HCL 25 MG
25 CAPSULE ORAL EVERY 4 HOURS PRN
Status: DISCONTINUED | OUTPATIENT
Start: 2019-09-18 | End: 2019-09-19

## 2019-09-18 RX ORDER — HYDROMORPHONE HYDROCHLORIDE 1 MG/ML
0.4 INJECTION, SOLUTION INTRAMUSCULAR; INTRAVENOUS; SUBCUTANEOUS EVERY 2 HOUR PRN
Status: DISCONTINUED | OUTPATIENT
Start: 2019-09-18 | End: 2019-09-19

## 2019-09-18 RX ORDER — SODIUM CHLORIDE, SODIUM LACTATE, POTASSIUM CHLORIDE, CALCIUM CHLORIDE 600; 310; 30; 20 MG/100ML; MG/100ML; MG/100ML; MG/100ML
INJECTION, SOLUTION INTRAVENOUS CONTINUOUS
Status: DISCONTINUED | OUTPATIENT
Start: 2019-09-18 | End: 2019-09-19

## 2019-09-18 RX ORDER — LISINOPRIL 10 MG/1
10 TABLET ORAL DAILY
COMMUNITY
End: 2020-01-30

## 2019-09-18 RX ORDER — HYDROMORPHONE HYDROCHLORIDE 1 MG/ML
0.2 INJECTION, SOLUTION INTRAMUSCULAR; INTRAVENOUS; SUBCUTANEOUS EVERY 2 HOUR PRN
Status: DISCONTINUED | OUTPATIENT
Start: 2019-09-18 | End: 2019-09-19

## 2019-09-18 RX ORDER — ASPIRIN 325 MG
325 TABLET ORAL 2 TIMES DAILY
Status: DISCONTINUED | OUTPATIENT
Start: 2019-09-18 | End: 2019-09-19

## 2019-09-18 RX ORDER — HYDROMORPHONE HYDROCHLORIDE 1 MG/ML
0.4 INJECTION, SOLUTION INTRAMUSCULAR; INTRAVENOUS; SUBCUTANEOUS EVERY 5 MIN PRN
Status: DISCONTINUED | OUTPATIENT
Start: 2019-09-18 | End: 2019-09-18 | Stop reason: HOSPADM

## 2019-09-18 RX ORDER — SENNOSIDES 8.6 MG
17.2 TABLET ORAL NIGHTLY
Status: DISCONTINUED | OUTPATIENT
Start: 2019-09-18 | End: 2019-09-19

## 2019-09-18 RX ORDER — SCOLOPAMINE TRANSDERMAL SYSTEM 1 MG/1
1 PATCH, EXTENDED RELEASE TRANSDERMAL ONCE
Status: DISCONTINUED | OUTPATIENT
Start: 2019-09-18 | End: 2019-09-19

## 2019-09-18 RX ORDER — TRAMADOL HYDROCHLORIDE 50 MG/1
50 TABLET ORAL EVERY 6 HOURS
Status: DISCONTINUED | OUTPATIENT
Start: 2019-09-18 | End: 2019-09-19

## 2019-09-18 RX ORDER — POLYETHYLENE GLYCOL 3350 17 G/17G
17 POWDER, FOR SOLUTION ORAL DAILY PRN
Status: DISCONTINUED | OUTPATIENT
Start: 2019-09-18 | End: 2019-09-19

## 2019-09-18 RX ORDER — SODIUM CHLORIDE, SODIUM LACTATE, POTASSIUM CHLORIDE, CALCIUM CHLORIDE 600; 310; 30; 20 MG/100ML; MG/100ML; MG/100ML; MG/100ML
INJECTION, SOLUTION INTRAVENOUS CONTINUOUS
Status: DISCONTINUED | OUTPATIENT
Start: 2019-09-18 | End: 2019-09-18 | Stop reason: HOSPADM

## 2019-09-18 RX ORDER — METOCLOPRAMIDE HYDROCHLORIDE 5 MG/ML
10 INJECTION INTRAMUSCULAR; INTRAVENOUS EVERY 6 HOURS PRN
Status: DISCONTINUED | OUTPATIENT
Start: 2019-09-18 | End: 2019-09-19

## 2019-09-18 RX ORDER — DOCUSATE SODIUM 100 MG/1
100 CAPSULE, LIQUID FILLED ORAL 2 TIMES DAILY
Status: DISCONTINUED | OUTPATIENT
Start: 2019-09-18 | End: 2019-09-19

## 2019-09-18 RX ORDER — DIPHENHYDRAMINE HYDROCHLORIDE 50 MG/ML
25 INJECTION INTRAMUSCULAR; INTRAVENOUS ONCE AS NEEDED
Status: ACTIVE | OUTPATIENT
Start: 2019-09-18 | End: 2019-09-18

## 2019-09-18 RX ORDER — ONDANSETRON 2 MG/ML
4 INJECTION INTRAMUSCULAR; INTRAVENOUS EVERY 4 HOURS PRN
Status: DISCONTINUED | OUTPATIENT
Start: 2019-09-18 | End: 2019-09-19

## 2019-09-18 RX ORDER — ACETAMINOPHEN 500 MG
1000 TABLET ORAL ONCE
COMMUNITY
End: 2019-09-25 | Stop reason: ALTCHOICE

## 2019-09-18 RX ORDER — HYDROCODONE BITARTRATE AND ACETAMINOPHEN 10; 325 MG/1; MG/1
1-2 TABLET ORAL EVERY 4 HOURS PRN
Qty: 60 TABLET | Refills: 0 | Status: SHIPPED | OUTPATIENT
Start: 2019-09-18 | End: 2020-07-29 | Stop reason: ALTCHOICE

## 2019-09-18 RX ORDER — DIPHENHYDRAMINE HYDROCHLORIDE 50 MG/ML
12.5 INJECTION INTRAMUSCULAR; INTRAVENOUS EVERY 4 HOURS PRN
Status: DISCONTINUED | OUTPATIENT
Start: 2019-09-18 | End: 2019-09-19

## 2019-09-18 RX ORDER — ACETAMINOPHEN 500 MG
1000 TABLET ORAL ONCE
Status: DISCONTINUED | OUTPATIENT
Start: 2019-09-18 | End: 2019-09-18 | Stop reason: HOSPADM

## 2019-09-18 RX ORDER — OXYCODONE HYDROCHLORIDE 5 MG/1
5 TABLET ORAL EVERY 4 HOURS PRN
Status: DISCONTINUED | OUTPATIENT
Start: 2019-09-18 | End: 2019-09-19

## 2019-09-18 RX ORDER — SODIUM PHOSPHATE, DIBASIC AND SODIUM PHOSPHATE, MONOBASIC 7; 19 G/133ML; G/133ML
1 ENEMA RECTAL ONCE AS NEEDED
Status: DISCONTINUED | OUTPATIENT
Start: 2019-09-18 | End: 2019-09-19

## 2019-09-18 NOTE — ANESTHESIA POSTPROCEDURE EVALUATION
[facility] Patient Status:  Surgery Admit - Inpt   Age/Gender 59year old male MRN [id number]   [facility] SURGERY Attending Chase Jamison MD   Hosp Day # 0 PCP Kennedi Montenegro.  Abigail Xavier DO       Anesthesia Post-op Note

## 2019-09-18 NOTE — PLAN OF CARE
Participating in therapy. A&Ox4. On ra pco & scds in place. Tolerating diet. Dtv. Ivf infusing. Pain controlled on pain meds. Vss. Im md paged regarding consult. Will continue to monitor.  Pt & family verbalized understanding of poc & call dont fall protoco

## 2019-09-18 NOTE — ANESTHESIA PREPROCEDURE EVALUATION
PRE-OP EVALUATION    Patient Name: Luis Alberto Hackett    Pre-op Diagnosis: Primary osteoarthritis of left hip [M16.12]    Procedure(s):  LEFT TOTAL HIP ARTHROPLASTY     Surgeon(s) and Role:     Gil Salcedo MD - Primary    Pre-op vitals reviewed. Neuro/Psych    Negative neuro/psych ROS.                                 Past Surgical History:   Procedure Laterality Date   • ARTHROSCOPY SHOULDER DEBRIDEMENT Left 1/28/2019    Performed by Susan Boyd MD at [address] [place] [address]   • ARTHROSCOPY normal     Dental    No notable dental history. Pulmonary    Pulmonary exam normal.  Breath sounds clear to auscultation bilaterally.                Other findings            ASA: 2   Plan: spinal and regional  NPO status verified and     Post-proce

## 2019-09-18 NOTE — PHYSICAL THERAPY NOTE
Attempted to see for PT evaluation. Pt just beginning to have return sensation left LE, able to perform ankle pumps, but cont with minimal left quad contraction and pt unable to perform left SAQ. Will re attempt when appropriate.   Discussed with rn, Michelle Meeksing

## 2019-09-18 NOTE — INTERVAL H&P NOTE
Pre-op Diagnosis: Primary osteoarthritis of left hip [M16.12]    The above referenced H&P was reviewed by Ba Toth MD on 9/18/2019, the patient was examined and no significant changes have occurred in the patient's condition since the H&P was perfo

## 2019-09-18 NOTE — OPERATIVE REPORT
DATE OF PROCEDURE:  9/18/2019  PREOPERATIVE DIAGNOSIS: Severe osteoarthritis left hip. POSTOPERATIVE DIAGNOSIS: Severe osteoarthritis left hip. PROCEDURE PERFORMED: Non-cemented left total hip arthroplasty. SURGEON:  GREG Thompson ASSIST fixation. An apical hole eliminator was used. . A neutral liner to fit the acetabulum and to accept a 36 mm femoral head was impacted into place. Attention was turned to the femur. A cylinder osteotome was used to clean out the greater trochanter.  A starti

## 2019-09-18 NOTE — CONSULTS
[address] Patient Status:  Inpatient    1955 MRN [id number]   [facility] 3SW-A Attending Denise Sampson MD   Hosp Day # 0 PCP Puja aPlencia.  Raymond Craft DO     Reason for consult: Medical manageme ANAPHYLAXIS    Medications:    No current facility-administered medications on file prior to encounter. Current Outpatient Medications on File Prior to Encounter:  lisinopril 10 MG Oral Tab Take 10 mg by mouth daily.  Disp:  Rfl:    acetaminophen 500 MG O ALKPHO, AST, ALT, BILT, TP in the last 168 hours. No results for input(s): PTP, INR in the last 168 hours. No results for input(s): TROP, CK in the last 168 hours. Imaging: Imaging data reviewed in Epic. ASSESSMENT / PLAN:     1.  XAVIER:  PT/OT,

## 2019-09-19 VITALS
WEIGHT: 221.31 LBS | BODY MASS INDEX: 29.97 KG/M2 | SYSTOLIC BLOOD PRESSURE: 122 MMHG | RESPIRATION RATE: 18 BRPM | HEIGHT: 72 IN | OXYGEN SATURATION: 98 % | HEART RATE: 66 BPM | TEMPERATURE: 99 F | DIASTOLIC BLOOD PRESSURE: 64 MMHG

## 2019-09-19 LAB
DEPRECATED RDW RBC AUTO: 46.8 FL (ref 35.1–46.3)
ERYTHROCYTE [DISTWIDTH] IN BLOOD BY AUTOMATED COUNT: 13.8 % (ref 11–15)
HCT VFR BLD AUTO: 39.3 % (ref 39–53)
HGB BLD-MCNC: 13.3 G/DL (ref 13–17.5)
MCH RBC QN AUTO: 31.4 PG (ref 26–34)
MCHC RBC AUTO-ENTMCNC: 33.8 G/DL (ref 31–37)
MCV RBC AUTO: 92.7 FL (ref 80–100)
PLATELET # BLD AUTO: 154 10(3)UL (ref 150–450)
RBC # BLD AUTO: 4.24 X10(6)UL (ref 4.3–5.7)
WBC # BLD AUTO: 13.3 X10(3) UL (ref 4–11)

## 2019-09-19 PROCEDURE — 99232 SBSQ HOSP IP/OBS MODERATE 35: CPT | Performed by: HOSPITALIST

## 2019-09-19 RX ORDER — ASPIRIN 325 MG
325 TABLET, DELAYED RELEASE (ENTERIC COATED) ORAL 2 TIMES DAILY
Qty: 56 TABLET | Refills: 0 | Status: SHIPPED | OUTPATIENT
Start: 2019-09-19 | End: 2019-09-19

## 2019-09-19 RX ORDER — ACETAMINOPHEN 325 MG/1
650 TABLET ORAL EVERY 6 HOURS PRN
Status: DISCONTINUED | OUTPATIENT
Start: 2019-09-19 | End: 2019-09-19

## 2019-09-19 RX ORDER — HYDROCODONE BITARTRATE AND ACETAMINOPHEN 10; 325 MG/1; MG/1
1 TABLET ORAL EVERY 4 HOURS PRN
Status: DISCONTINUED | OUTPATIENT
Start: 2019-09-19 | End: 2019-09-19

## 2019-09-19 RX ORDER — HYDROCODONE BITARTRATE AND ACETAMINOPHEN 10; 325 MG/1; MG/1
2 TABLET ORAL EVERY 4 HOURS PRN
Status: DISCONTINUED | OUTPATIENT
Start: 2019-09-19 | End: 2019-09-19

## 2019-09-19 RX ORDER — PANTOPRAZOLE SODIUM 20 MG/1
20 TABLET, DELAYED RELEASE ORAL
Status: DISCONTINUED | OUTPATIENT
Start: 2019-09-19 | End: 2019-09-19

## 2019-09-19 RX ORDER — ASPIRIN 325 MG
325 TABLET ORAL 2 TIMES DAILY
Qty: 56 TABLET | Refills: 0 | Status: SHIPPED | OUTPATIENT
Start: 2019-09-19

## 2019-09-19 RX ORDER — ASPIRIN 325 MG
325 TABLET ORAL 2 TIMES DAILY
Qty: 56 TABLET | Refills: 0 | Status: SHIPPED | OUTPATIENT
Start: 2019-09-19 | End: 2019-09-19

## 2019-09-19 RX ORDER — CALCIUM CARBONATE 200(500)MG
1000 TABLET,CHEWABLE ORAL 3 TIMES DAILY PRN
Status: DISCONTINUED | OUTPATIENT
Start: 2019-09-19 | End: 2019-09-19

## 2019-09-19 NOTE — PROGRESS NOTES
Post Op Day 1 Ortho Note    Status Post Nerve Block:  Type of Nerve Block: Left Fascia Iliaca XAVIER  Single Injection Nerve Block    Post op review: No evidence of immediate block related complications, No paresthesia noted, Able to lift leg(s), Able to plan

## 2019-09-19 NOTE — RESPIRATORY THERAPY NOTE
AILEEN Equipment Usage Summary :            Set Mode :AUTO CPAP W FLEX          Usage in Hours:7;58          90% Pressure (EPAP) : 8.6           90% Insp Pressure (IPAP);           AHI : 4.4          Supplemental Oxygen :      LPM

## 2019-09-19 NOTE — PHYSICAL THERAPY NOTE
PHYSICAL THERAPY HIP EVALUATION - INPATIENT     Room Number: 363/363-A  Evaluation Date: 9/19/2019  Type of Evaluation: Initial  Physician Order: PT Eval and Treat    Presenting Problem: s/p left XAVIER 9/18/19  Reason for Therapy: Mobility Dysfunction and Alex Huang anterior(no hip flx >90 degrees)  Fall Risk: Standard fall risk    WEIGHT BEARING RESTRICTION  Weight Bearing Restriction: L lower extremity           L Lower Extremity: Weight Bearing as Tolerated    PAIN ASSESSMENT  Ratin(during amb, no pain at rest) in role of PT, group therapy, ankle pumps for DVT prevention, anterior hip precautions+no hip flx >90 degrees, goals for session. Pt able to perform ankle pumps, reports return of sensation left LE, able to perform left quad set and SAQ with good quality. and comorbidities manifest themselves as functional limitations in independent bed mobility, transfers, and gait.   The patient is below baseline and would benefit from skilled inpatient PT to address the above deficits to assist patient in returning to [facility]

## 2019-09-19 NOTE — OCCUPATIONAL THERAPY NOTE
OCCUPATIONAL THERAPY QUICK EVALUATION - INPATIENT    Room Number: 363/363-A  Evaluation Date: 9/19/2019     Type of Evaluation: Quick Eval  Presenting Problem: (L XAVIER)    Physician Order: IP Consult to Occupational Therapy  Reason for Therapy:  ADL/IADL Dy needed. SUBJECTIVE  \"I hope I can go home today\"    Patient self-stated goal is to go home.     OBJECTIVE  Precautions: XAVIER - anterior(no hip flx >90 degrees)  Fall Risk: Standard fall risk    WEIGHT BEARING RESTRICTION  Weight Bearing Restriction: L l don L sock; Patient stood with SBA and pulled pants over hips with SBA; functional mobility completed using RW with SBA for household distances with good tolerance and supervision.    OT educated patient on safe RW use and sequencing for toileting; patient Comorbidities and min to mod modifications of tasks    Clinical Decision Making  LOW - Analysis of occupational profile, problem-focused assessments, limited treatment options    Overall Complexity  LOW     OT Discharge Recommendations: Home(assist during

## 2019-09-19 NOTE — PROGRESS NOTES
Assumed care of pt at 1120. Pt resting in chair, no complaints. Will medicate for pain prior to therapy, possibly d/c today depending on pain control. Wife at bedside. Will monitor.

## 2019-09-19 NOTE — PHYSICAL THERAPY NOTE
PHYSICAL THERAPY HIP TREATMENT NOTE - INPATIENT      Room Number: 363/363-A     Session: 1&2  Number of Visits to Meet Established Goals: 3    Presenting Problem: s/p left XAVIER 9/18/19    Problem List  Active Problems:    Primary osteoarthritis of left hip sheets and blankets)?: None   -   Sitting down on and standing up from a chair with arms (e.g., wheelchair, bedside commode, etc.): None   -   Moving from lying on back to sitting on the side of the bed?: None   How much help from another person does the p slides 10 reps 15 reps   Saq 10 reps 15 reps   Sitting Knee Extension 10 reps 15 reps   Standing heel/toe raises 10 reps 15 reps   Hamstring Curls 10 reps 15 reps   Forward, back steps 10 reps 15 reps   Short Squats 10 reps 15 reps     Comments:  Pt partic

## 2019-09-19 NOTE — PROGRESS NOTES
[facility] [address] Patient Status:  Inpatient    1955 MRN [id number]   [facility] 3SW-A Attending Shanae Hall MD   [facility] Day # 1 PCP Lindsey Bob.  Sebas Jewell DO     Subjective:  LeftTotal Hip Arthroplasty  Systemic o

## 2019-09-19 NOTE — PLAN OF CARE
Pt still has decreased sensation on the LLE. He still unable to do leg raise. Aquacel dressing is C/D/I, regular ice maintained. Ankle pumps were encouraged as well as IS use. Tens in place. PT/OT to see.  Pain management plan explained, verbalized Gracia

## 2019-09-19 NOTE — CM/SW NOTE
Received call from [facility] ([phone]) who stated that she would authorize [facility] RN and PT for patient. They use [employer] ([phone]) to arrange for [facility] provider.   She will send them referral with request that they arranged for [facility] for possible DC t

## 2019-09-19 NOTE — CM/SW NOTE
Received call from Manuel with [employer] ([phone], fax: [phone]) who requested clinical referral in order that she can arrange for [facility] provider for discharge today. Referral with [facility] order faxed as requested.   She will work on obtaining [facility] provid

## 2019-09-19 NOTE — PROGRESS NOTES
[facility]ITAL[facility]  Progress Note     Ricardo Romero Patient Status:  Inpatient    1955 MRN [id number]   [facility] 3[facility]A Attending Silvina Brewster MD   [address] Day # 1 PCP Sivan Shelton.  Sy Sher DO     Chief Complaint: Medical Managem • traMADol HCl  50 mg Oral Q6H       ASSESSMENT / PLAN:     1. XAVIER:  PT/OT  2. HTN:  Hold BP meds until bp allows  3.  AILEEN:  CPAP at night    Plan of care: As above    Quality:  · DVT Prophylaxis: ASA per ortho  · CODE status: Full  · Sorto: None  · Centr

## 2019-09-19 NOTE — CM/SW NOTE
09/19/19 1000   CM/SW Referral Data   Referral Source Social Work (self-referral)   Reason for Referral Discharge planning   Informant Patient;Spouse   Patient Info   Patient's Mental Status Alert;Oriented   Choice of HHC/SNF/HOSPICE   Informed of right

## 2019-09-25 ENCOUNTER — OFFICE VISIT (OUTPATIENT)
Dept: FAMILY MEDICINE CLINIC | Facility: CLINIC | Age: 64
End: 2019-09-25
Payer: OTHER MISCELLANEOUS

## 2019-09-25 VITALS
BODY MASS INDEX: 32 KG/M2 | HEART RATE: 76 BPM | WEIGHT: 222 LBS | TEMPERATURE: 98 F | DIASTOLIC BLOOD PRESSURE: 76 MMHG | RESPIRATION RATE: 16 BRPM | SYSTOLIC BLOOD PRESSURE: 110 MMHG

## 2019-09-25 DIAGNOSIS — I10 ESSENTIAL HYPERTENSION: ICD-10-CM

## 2019-09-25 DIAGNOSIS — Z96.642 S/P HIP REPLACEMENT, LEFT: Primary | ICD-10-CM

## 2019-09-25 DIAGNOSIS — Z23 NEED FOR VACCINATION: ICD-10-CM

## 2019-09-25 DIAGNOSIS — Z98.890 POSTOPERATIVE STATE: ICD-10-CM

## 2019-09-25 PROCEDURE — 90686 IIV4 VACC NO PRSV 0.5 ML IM: CPT | Performed by: FAMILY MEDICINE

## 2019-09-25 PROCEDURE — 90471 IMMUNIZATION ADMIN: CPT | Performed by: FAMILY MEDICINE

## 2019-09-25 PROCEDURE — 1111F DSCHRG MED/CURRENT MED MERGE: CPT | Performed by: FAMILY MEDICINE

## 2019-09-25 PROCEDURE — 99213 OFFICE O/P EST LOW 20 MIN: CPT | Performed by: FAMILY MEDICINE

## 2019-09-25 RX ORDER — SENNOSIDES 8.6 MG
8.6 TABLET ORAL DAILY
COMMUNITY
End: 2020-07-29

## 2019-09-25 NOTE — PATIENT INSTRUCTIONS
I reviewed wound care instructions, keep wound clean and dry,  Home health nurse for dressing changes  Continue home physical therapy until able to more safely leave the home and outpatient therapy as prescribed by Dr. Attila Álvarez off lisinopril and m

## 2019-09-25 NOTE — PROGRESS NOTES
HPI:    Patient ID: Erendira Marin is a 59year old male. Patient presents with:  Hospital F/U: Left hip replacement      Total hip arthroplasty at [facility] on 9/18. He was discharged the following day. Home health care was initiated.  Home t Gastrointestinal: Negative. Some initial constipation resolved with combination of MiraLAX and senna. Patient is only using senna occasionally now   Musculoskeletal: Positive for arthralgias (left hip) and gait problem ( using walker).    Skin: Pos I reviewed wound care instructions, keep wound clean and dry,  Home health nurse for dressing changes  Continue home physical therapy until able to more safely leave the home and outpatient therapy as prescribed by Dr. Tg Byrd off lisinopril and m

## 2019-10-01 ENCOUNTER — HOSPITAL ENCOUNTER (OUTPATIENT)
Dept: GENERAL RADIOLOGY | Age: 64
Discharge: HOME | End: 2019-10-01
Attending: ORTHOPAEDIC SURGERY
Payer: OTHER MISCELLANEOUS

## 2019-10-01 ENCOUNTER — ORDER TRANSCRIPTION (OUTPATIENT)
Dept: PHYSICAL THERAPY | Age: 64
End: 2019-10-01

## 2019-10-01 DIAGNOSIS — Z96.642 STATUS POST TOTAL REPLACEMENT OF LEFT HIP: ICD-10-CM

## 2019-10-01 DIAGNOSIS — M16.12 PRIMARY OSTEOARTHRITIS OF LEFT HIP: ICD-10-CM

## 2019-10-01 PROCEDURE — 73502 X-RAY EXAM HIP UNI 2-3 VIEWS: CPT | Performed by: ORTHOPAEDIC SURGERY

## 2019-10-01 NOTE — PROGRESS NOTES
POST-OP HIP EVALUATION:   Referring Physician: Dr. Adhikari   Diagnosis: L THR     Date of Service: 10/3/2019     PATIENT SUMMARY   Luis Alberto Hackett is a 59year old male who presents to therapy today s/p L XAVIER with anterior approach on 9/18/2019.  Current 0  Abduction: R 45; L 35  ER: R 40; L 0  IR: R 15; L 0     PROM: (* denotes performed with pain)  Hip   Flexion: R 115; L 90  Extension: R 10;  L 5  Abduction: R 45; L 40  ER: R 40; L 5  IR: R 15; L 5       Flexibility:   Hamstrings: R Mild tightness; L Mod community ambulation  · Pt will be able to squat to  light objects around the house with <2/10 hip pain   · Pt will improve functional hip strength to report ability to ascend/descend 1 flight of stairs reciprocally without use of handrail  · Pt declan

## 2019-10-03 ENCOUNTER — OFFICE VISIT (OUTPATIENT)
Dept: PHYSICAL THERAPY | Age: 64
End: 2019-10-03
Attending: FAMILY MEDICINE
Payer: OTHER MISCELLANEOUS

## 2019-10-03 DIAGNOSIS — M16.12 PRIMARY OSTEOARTHRITIS OF LEFT HIP: ICD-10-CM

## 2019-10-03 DIAGNOSIS — Z96.642 STATUS POST TOTAL REPLACEMENT OF LEFT HIP: ICD-10-CM

## 2019-10-03 PROCEDURE — 97162 PT EVAL MOD COMPLEX 30 MIN: CPT

## 2019-10-03 PROCEDURE — 97110 THERAPEUTIC EXERCISES: CPT

## 2019-10-04 ENCOUNTER — TELEPHONE (OUTPATIENT)
Dept: FAMILY MEDICINE CLINIC | Facility: CLINIC | Age: 64
End: 2019-10-04

## 2019-10-04 NOTE — TELEPHONE ENCOUNTER
Faxed received from [facility]. Signed by Dr. Jerry Macdonald, and faxed back on 9/30/19.

## 2019-10-08 ENCOUNTER — OFFICE VISIT (OUTPATIENT)
Dept: PHYSICAL THERAPY | Age: 64
End: 2019-10-08
Attending: FAMILY MEDICINE
Payer: OTHER MISCELLANEOUS

## 2019-10-08 PROCEDURE — 97140 MANUAL THERAPY 1/> REGIONS: CPT

## 2019-10-08 PROCEDURE — 97110 THERAPEUTIC EXERCISES: CPT

## 2019-10-08 NOTE — PROGRESS NOTES
Dx:  L THR         Insurance (Authorized # of Visits):  8 WC           Authorizing Physician: Dr. Dell Chen  Next MD visit: none scheduled  Fall Risk: standard         Precautions: n/a             Subjective: Hip feels great, a little weak and a little sore, RE-ED  SLS 3x30\" on pad       MANUAL THERAPY  STM scar 8'              HEP: bridge, hip abd, hip add     Charges: therapeutic ex 2, man therapy       Total Timed Treatment: 45 min  Total Treatment Time: 45 min

## 2019-10-11 ENCOUNTER — OFFICE VISIT (OUTPATIENT)
Dept: PHYSICAL THERAPY | Age: 64
End: 2019-10-11
Attending: FAMILY MEDICINE
Payer: OTHER MISCELLANEOUS

## 2019-10-11 PROCEDURE — 97110 THERAPEUTIC EXERCISES: CPT

## 2019-10-11 PROCEDURE — 97140 MANUAL THERAPY 1/> REGIONS: CPT

## 2019-10-11 NOTE — PROGRESS NOTES
Dx:  L THR         Insurance (Authorized # of Visits):  8 WC           Authorizing Physician: Dr. Dell Chen  Next MD visit: 11/5/2019  Fall Risk: standard         Precautions: post hip           Subjective: Dislocated hip sliding over on bleachers at [facility] stretch 3x30\"  IP/rectus stretch 3x30\"  Bridge 3x10  Hip add squeeze 3x10  Clamshells 3x10  Lateral stepping 25'x4  Shuttle 56 3x10 (stopper limiting flexion to 90) THERAPEUTIC EX  Nu-step L5 8'  HIP PROM  Hip abduction stretch 3x30\"  IP/rectus stretch

## 2019-10-16 ENCOUNTER — OFFICE VISIT (OUTPATIENT)
Dept: PHYSICAL THERAPY | Age: 64
End: 2019-10-16
Attending: FAMILY MEDICINE
Payer: OTHER MISCELLANEOUS

## 2019-10-16 PROCEDURE — 97110 THERAPEUTIC EXERCISES: CPT

## 2019-10-16 PROCEDURE — 97140 MANUAL THERAPY 1/> REGIONS: CPT

## 2019-10-16 NOTE — PROGRESS NOTES
Dx:  L THR         Insurance (Authorized # of Visits):  8 WC           Authorizing Physician: Dr. Fabiola Mancuso  Next MD visit: 11/5/2019  Fall Risk: standard         Precautions: post hip           Subjective: Hip has felt pretty good in the last week, feel lik limiting flexion to 90) THERAPEUTIC EX  Nu-step L5 8'  HIP PROM  Hip abduction stretch 3x30\"  IP/rectus stretch 3x30\"  Bridge 3x10  Hip add squeeze 3x10  BKFO isometrics 3x10 into manual resistance  Standing hip abduction 3x10 B  Standing hip extension 3

## 2019-10-18 ENCOUNTER — APPOINTMENT (OUTPATIENT)
Dept: PHYSICAL THERAPY | Age: 64
End: 2019-10-18
Attending: FAMILY MEDICINE
Payer: OTHER MISCELLANEOUS

## 2019-10-24 ENCOUNTER — OFFICE VISIT (OUTPATIENT)
Dept: PHYSICAL THERAPY | Age: 64
End: 2019-10-24
Attending: FAMILY MEDICINE
Payer: OTHER MISCELLANEOUS

## 2019-10-24 PROCEDURE — 97140 MANUAL THERAPY 1/> REGIONS: CPT

## 2019-10-24 PROCEDURE — 97110 THERAPEUTIC EXERCISES: CPT

## 2019-10-24 NOTE — PROGRESS NOTES
Dx:  L THR         Insurance (Authorized # of Visits):  8 WC           Authorizing Physician: Dr. Sosa Samuels MD visit: 11/5/2019  Fall Risk: standard         Precautions: post hip           Subjective: Hip tolerated travel well, some expected soreness, 3x30\"  Bridge 3x10  Hip add squeeze 3x10  Clamshells 3x10  Lateral stepping 25'x4  Shuttle 56 3x10 (stopper limiting flexion to 90) THERAPEUTIC EX  Nu-step L5 8'  HIP PROM  Hip abduction stretch 3x30\"  IP/rectus stretch 3x30\"  Bridge 3x10  Hip add squee

## 2019-10-25 ENCOUNTER — OFFICE VISIT (OUTPATIENT)
Dept: PHYSICAL THERAPY | Age: 64
End: 2019-10-25
Attending: FAMILY MEDICINE
Payer: OTHER MISCELLANEOUS

## 2019-10-25 PROCEDURE — 97140 MANUAL THERAPY 1/> REGIONS: CPT

## 2019-10-25 PROCEDURE — 97110 THERAPEUTIC EXERCISES: CPT

## 2019-10-25 NOTE — PROGRESS NOTES
Dx:  L THR         Insurance (Authorized # of Visits):  8 WC           Authorizing Physician: Dr. Marino Samuels MD visit: 11/5/2019  Fall Risk: standard         Precautions: post hip           Subjective:Continue to feel better, walking still fatigues easi EX  Nu-step L5 8'  HIP PROM  Hip abduction stretch 3x30\"  IP/rectus stretch 3x30\"  Bridge 3x10  Hip add squeeze 3x10  BKFO isometrics 3x10 into manual resistance  Standing hip abduction 3x10 B  Standing hip extension 3x10 B   THERAPEUTIC EX  Nu-step L5 8

## 2019-10-28 ENCOUNTER — OFFICE VISIT (OUTPATIENT)
Dept: PHYSICAL THERAPY | Age: 64
End: 2019-10-28
Attending: FAMILY MEDICINE
Payer: OTHER MISCELLANEOUS

## 2019-10-28 PROCEDURE — 97110 THERAPEUTIC EXERCISES: CPT

## 2019-10-28 PROCEDURE — 97140 MANUAL THERAPY 1/> REGIONS: CPT

## 2019-10-28 NOTE — PROGRESS NOTES
Dx:  L THR         Insurance (Authorized # of Visits):  8 WC           Authorizing Physician: Dr. Sebas Jewell  Next MD visit: 11/5/2019  Fall Risk: standard         Precautions: post hip           Subjective:Helped mom move over weekend, no issues, but didn't (stopper limiting flexion to 90) THERAPEUTIC EX  Nu-step L5 8'  HIP PROM  Hip abduction stretch 3x30\"  IP/rectus stretch 3x30\"  Bridge 3x10  Hip add squeeze 3x10  BKFO isometrics 3x10 into manual resistance  Standing hip abduction 3x10 B  Standing hip ex

## 2019-10-31 ENCOUNTER — MED REC SCAN ONLY (OUTPATIENT)
Dept: FAMILY MEDICINE CLINIC | Facility: CLINIC | Age: 64
End: 2019-10-31

## 2019-10-31 ENCOUNTER — OFFICE VISIT (OUTPATIENT)
Dept: PHYSICAL THERAPY | Age: 64
End: 2019-10-31
Attending: FAMILY MEDICINE
Payer: OTHER MISCELLANEOUS

## 2019-10-31 PROCEDURE — 97140 MANUAL THERAPY 1/> REGIONS: CPT

## 2019-10-31 PROCEDURE — 97110 THERAPEUTIC EXERCISES: CPT

## 2019-10-31 NOTE — PROGRESS NOTES
Received a fax on 10/28/19 from [facility]. Fax signed and faxed back on same day.

## 2019-10-31 NOTE — PROGRESS NOTES
Discharge Summary  Pt has attended 8 visits in Physical Therapy.    Dx: Jose Galloway THR         Insurance (Authorized # of Visits):  8 WC           Authorizing Physician: Dr. Martinez Listen  Next MD visit: 11/5/2019  Fall Risk: standard         Precautions: post hip pain -Met  · Pt will improve functional hip strength to report ability to ascend/descend 1 flight of stairs reciprocally without use of handrail-Met  · Pt will be independent and compliant with comprehensive HEP to maintain progress achieved in PT-Met    F stretch 3x30\"  Bridge 3x10  Clamshells 3x10  SL hip abduction A 3x10  Hip add squeeze 3x10  Shuttle 56 3x10  Standing hip abd red 2x10 B  Lateral stepping 8'x6 grn  Lateral step ups 4\" 2x10  Hip hike 2x10 B THERAPEUTIC EX  Nu-step L5 8'  HIP PROM  Hip ab

## 2020-01-30 RX ORDER — LISINOPRIL 10 MG/1
TABLET ORAL
Qty: 90 TABLET | Refills: 0 | Status: SHIPPED | OUTPATIENT
Start: 2020-01-30 | End: 2020-05-27

## 2020-01-30 NOTE — TELEPHONE ENCOUNTER
Last refill #90 on 9/6/19  Last office visit on 9/25/19  No future appointments.   BP Readings from Last 3 Encounters:  09/25/19 : 110/76  09/19/19 : 122/64  09/07/19 : 126/80    Last CMP on 9/7/19  Refilled per protocol    Hypertension Medications Protocol

## 2020-05-27 ENCOUNTER — TELEPHONE (OUTPATIENT)
Dept: FAMILY MEDICINE CLINIC | Facility: CLINIC | Age: 65
End: 2020-05-27

## 2020-05-27 RX ORDER — LISINOPRIL 10 MG/1
TABLET ORAL
Qty: 90 TABLET | Refills: 0 | OUTPATIENT
Start: 2020-05-27

## 2020-05-27 RX ORDER — LISINOPRIL 10 MG/1
TABLET ORAL
Qty: 30 TABLET | Refills: 0 | Status: SHIPPED | OUTPATIENT
Start: 2020-05-27 | End: 2020-07-29

## 2020-05-27 NOTE — TELEPHONE ENCOUNTER
Last refill: 1/30/20 #90 w/ 0r efills  Last OV: 9/25/19    Future Appointments   Date Time Provider Les Anderson   6/2/2020 10:10 AM Jerry Wing MD [facility]

## 2020-05-27 NOTE — TELEPHONE ENCOUNTER
Sent subpoena to scan state on 5/27/2020 for [facility], Yary [address] S    Mail o address above or fax to [phone]

## 2020-07-29 ENCOUNTER — OFFICE VISIT (OUTPATIENT)
Dept: FAMILY MEDICINE CLINIC | Facility: CLINIC | Age: 65
End: 2020-07-29
Payer: COMMERCIAL

## 2020-07-29 ENCOUNTER — HOSPITAL ENCOUNTER (OUTPATIENT)
Dept: GENERAL RADIOLOGY | Age: 65
Discharge: HOME | End: 2020-07-29
Attending: FAMILY MEDICINE
Payer: COMMERCIAL

## 2020-07-29 VITALS
HEIGHT: 72 IN | HEART RATE: 64 BPM | DIASTOLIC BLOOD PRESSURE: 98 MMHG | SYSTOLIC BLOOD PRESSURE: 158 MMHG | TEMPERATURE: 98 F | RESPIRATION RATE: 16 BRPM | BODY MASS INDEX: 31.97 KG/M2 | WEIGHT: 236 LBS

## 2020-07-29 DIAGNOSIS — M54.50 CHRONIC BILATERAL LOW BACK PAIN WITHOUT SCIATICA: ICD-10-CM

## 2020-07-29 DIAGNOSIS — I10 ESSENTIAL HYPERTENSION: Primary | ICD-10-CM

## 2020-07-29 DIAGNOSIS — M79.671 RIGHT FOOT PAIN: ICD-10-CM

## 2020-07-29 DIAGNOSIS — G89.29 CHRONIC BILATERAL LOW BACK PAIN WITHOUT SCIATICA: ICD-10-CM

## 2020-07-29 DIAGNOSIS — M19.071 PRIMARY OSTEOARTHRITIS OF RIGHT FOOT: ICD-10-CM

## 2020-07-29 DIAGNOSIS — E66.9 OBESITY (BMI 30.0-34.9): ICD-10-CM

## 2020-07-29 PROCEDURE — 3077F SYST BP >= 140 MM HG: CPT | Performed by: FAMILY MEDICINE

## 2020-07-29 PROCEDURE — 99214 OFFICE O/P EST MOD 30 MIN: CPT | Performed by: FAMILY MEDICINE

## 2020-07-29 PROCEDURE — 3080F DIAST BP >= 90 MM HG: CPT | Performed by: FAMILY MEDICINE

## 2020-07-29 PROCEDURE — 73630 X-RAY EXAM OF FOOT: CPT | Performed by: FAMILY MEDICINE

## 2020-07-29 PROCEDURE — 3008F BODY MASS INDEX DOCD: CPT | Performed by: FAMILY MEDICINE

## 2020-07-29 RX ORDER — LISINOPRIL 10 MG/1
TABLET ORAL
Qty: 90 TABLET | Refills: 0 | OUTPATIENT
Start: 2020-07-29

## 2020-07-29 RX ORDER — LISINOPRIL 10 MG/1
TABLET ORAL
Qty: 30 TABLET | Refills: 0 | Status: SHIPPED | OUTPATIENT
Start: 2020-07-29 | End: 2020-07-29 | Stop reason: DRUGHIGH

## 2020-07-29 RX ORDER — LISINOPRIL 20 MG/1
20 TABLET ORAL DAILY
Qty: 90 TABLET | Refills: 1 | Status: SHIPPED | OUTPATIENT
Start: 2020-07-29 | End: 2021-01-26

## 2020-07-29 NOTE — PATIENT INSTRUCTIONS
1. Essential hypertension  I reviewed goals for blood pressure as well as conservative management of hypertension including sodium restriction, daily aerobic activity, alcohol moderation, smoking cessation, and maintaining ideal body weight.   I asked leela

## 2020-07-29 NOTE — PROGRESS NOTES
HPI:    Patient ID: Giana Pabon is a 62year old male. Patient presents with:   Follow - Up: Blood Pressue  Medication Eval  Foot Pain: Right foot x 2 months  Back Pain    Pt has been checking bp 1-2 weekly, 140/80  Wt up 14# since last visit  Got Current Outpatient Medications   Medication Sig Dispense Refill   • lisinopril 20 MG Oral Tab Take 1 tablet (20 mg total) by mouth daily. 90 tablet 1   • Lansoprazole 15 MG Oral Capsule Delayed Release Take 15 mg by mouth daily.      • aspirin 325 MG Oral T Blood pressure (!) 158/98, pulse 64, temperature 98.4 °F (36.9 °C), temperature source Other, resp. rate 16, height 72\", weight 236 lb (107 kg). Right foot XR CONCLUSION:     1.   There is severe osteoarthritis involving the 1st MTP joint and the 2nd M No orders of the defined types were placed in this encounter. Meds This Visit:  Requested Prescriptions     Signed Prescriptions Disp Refills   • lisinopril 20 MG Oral Tab 90 tablet 1     Sig: Take 1 tablet (20 mg total) by mouth daily.        Imaging

## 2020-07-29 NOTE — TELEPHONE ENCOUNTER
LOV 9/25/19  B/P 110/76 preop exam    LAST LAB 9/19    LAST RX 5/27/20 #30    Next OV None scheduled detailed message left on identified voicemail that he needs OV    PROTOCOL

## 2020-12-02 PROBLEM — M75.102 LEFT ROTATOR CUFF TEAR ARTHROPATHY: Status: ACTIVE | Noted: 2020-12-02

## 2020-12-02 PROBLEM — M12.812 LEFT ROTATOR CUFF TEAR ARTHROPATHY: Status: ACTIVE | Noted: 2020-12-02

## 2021-01-26 RX ORDER — LISINOPRIL 20 MG/1
TABLET ORAL
Qty: 90 TABLET | Refills: 1 | Status: SHIPPED | OUTPATIENT
Start: 2021-01-26 | End: 2021-07-29

## 2021-01-26 NOTE — TELEPHONE ENCOUNTER
Last OV: 7/29/20  Last labs: 9/7/19    No future appointments. Due for OV-- [employer] message sent.

## 2021-03-01 NOTE — PATIENT INSTRUCTIONS
Reviewed goals for blood pressure as well as conservative management of hypertension including sodium restriction, daily aerobic activity and weight reduction.   I discussed importance of nightly use of CPAP as well as the cardiovascular complications of in
right normal/left normal

## 2021-03-27 ENCOUNTER — OFFICE VISIT (OUTPATIENT)
Dept: FAMILY MEDICINE CLINIC | Facility: CLINIC | Age: 66
End: 2021-03-27
Payer: COMMERCIAL

## 2021-03-27 VITALS
HEART RATE: 70 BPM | SYSTOLIC BLOOD PRESSURE: 138 MMHG | WEIGHT: 239.38 LBS | HEIGHT: 71 IN | OXYGEN SATURATION: 95 % | TEMPERATURE: 97 F | DIASTOLIC BLOOD PRESSURE: 82 MMHG | BODY MASS INDEX: 33.51 KG/M2

## 2021-03-27 DIAGNOSIS — Z96.652 STATUS POST LEFT KNEE REPLACEMENT: ICD-10-CM

## 2021-03-27 DIAGNOSIS — Z99.89 OSA ON CPAP: ICD-10-CM

## 2021-03-27 DIAGNOSIS — M75.102 ROTATOR CUFF SYNDROME OF LEFT SHOULDER: ICD-10-CM

## 2021-03-27 DIAGNOSIS — Z00.00 LABORATORY EXAM ORDERED AS PART OF ROUTINE GENERAL MEDICAL EXAMINATION: ICD-10-CM

## 2021-03-27 DIAGNOSIS — Z00.00 PREVENTATIVE HEALTH CARE: Primary | ICD-10-CM

## 2021-03-27 DIAGNOSIS — G47.33 OSA ON CPAP: ICD-10-CM

## 2021-03-27 DIAGNOSIS — Z13.220 SCREENING, LIPID: ICD-10-CM

## 2021-03-27 DIAGNOSIS — K21.9 GASTROESOPHAGEAL REFLUX DISEASE WITHOUT ESOPHAGITIS: ICD-10-CM

## 2021-03-27 DIAGNOSIS — E66.9 OBESITY (BMI 30.0-34.9): ICD-10-CM

## 2021-03-27 DIAGNOSIS — M54.50 CHRONIC BILATERAL LOW BACK PAIN WITHOUT SCIATICA: ICD-10-CM

## 2021-03-27 DIAGNOSIS — Z12.5 SCREENING FOR PROSTATE CANCER: ICD-10-CM

## 2021-03-27 DIAGNOSIS — I10 ESSENTIAL HYPERTENSION: ICD-10-CM

## 2021-03-27 DIAGNOSIS — Z96.642 S/P HIP REPLACEMENT, LEFT: ICD-10-CM

## 2021-03-27 DIAGNOSIS — G89.29 CHRONIC BILATERAL LOW BACK PAIN WITHOUT SCIATICA: ICD-10-CM

## 2021-03-27 LAB
ALBUMIN SERPL-MCNC: 3.9 G/DL (ref 3.4–5)
ALBUMIN/GLOB SERPL: 1.2 {RATIO} (ref 1–2)
ALP LIVER SERPL-CCNC: 96 U/L
ALT SERPL-CCNC: 27 U/L
ANION GAP SERPL CALC-SCNC: 6 MMOL/L (ref 0–18)
AST SERPL-CCNC: 18 U/L (ref 15–37)
BILIRUB SERPL-MCNC: 0.6 MG/DL (ref 0.1–2)
BUN BLD-MCNC: 13 MG/DL (ref 7–18)
BUN/CREAT SERPL: 14 (ref 10–20)
CALCIUM BLD-MCNC: 9.4 MG/DL (ref 8.5–10.1)
CHLORIDE SERPL-SCNC: 110 MMOL/L (ref 98–112)
CHOLEST SMN-MCNC: 186 MG/DL (ref ?–200)
CO2 SERPL-SCNC: 23 MMOL/L (ref 21–32)
COMPLEXED PSA SERPL-MCNC: 0.94 NG/ML (ref ?–4)
CREAT BLD-MCNC: 0.93 MG/DL
GLOBULIN PLAS-MCNC: 3.3 G/DL (ref 2.8–4.4)
GLUCOSE BLD-MCNC: 100 MG/DL (ref 70–99)
HDLC SERPL-MCNC: 58 MG/DL (ref 40–59)
LDLC SERPL CALC-MCNC: 113 MG/DL (ref ?–100)
M PROTEIN MFR SERPL ELPH: 7.2 G/DL (ref 6.4–8.2)
NONHDLC SERPL-MCNC: 128 MG/DL (ref ?–130)
OSMOLALITY SERPL CALC.SUM OF ELEC: 288 MOSM/KG (ref 275–295)
PATIENT FASTING Y/N/NP: YES
PATIENT FASTING Y/N/NP: YES
POTASSIUM SERPL-SCNC: 4.5 MMOL/L (ref 3.5–5.1)
SODIUM SERPL-SCNC: 139 MMOL/L (ref 136–145)
TRIGL SERPL-MCNC: 73 MG/DL (ref 30–149)
VLDLC SERPL CALC-MCNC: 15 MG/DL (ref 0–30)

## 2021-03-27 PROCEDURE — 36415 COLL VENOUS BLD VENIPUNCTURE: CPT | Performed by: FAMILY MEDICINE

## 2021-03-27 PROCEDURE — 3075F SYST BP GE 130 - 139MM HG: CPT | Performed by: FAMILY MEDICINE

## 2021-03-27 PROCEDURE — 3079F DIAST BP 80-89 MM HG: CPT | Performed by: FAMILY MEDICINE

## 2021-03-27 PROCEDURE — 99396 PREV VISIT EST AGE 40-64: CPT | Performed by: FAMILY MEDICINE

## 2021-03-27 PROCEDURE — 80061 LIPID PANEL: CPT | Performed by: FAMILY MEDICINE

## 2021-03-27 PROCEDURE — 3008F BODY MASS INDEX DOCD: CPT | Performed by: FAMILY MEDICINE

## 2021-03-27 PROCEDURE — 84153 ASSAY OF PSA TOTAL: CPT | Performed by: FAMILY MEDICINE

## 2021-03-27 PROCEDURE — 80053 COMPREHEN METABOLIC PANEL: CPT | Performed by: FAMILY MEDICINE

## 2021-03-27 NOTE — H&P
Erendira Marin is a 60year old male who presents for a complete physical exam.   HPI:   Pt voices no new concerns .  See MSK ros    Wt Readings from Last 6 Encounters:  03/27/21 : 239 lb 6 oz (108.6 kg)  07/29/20 : 236 lb (107 kg)  09/25/19 : 222 lb (10 Judd Brunner, MD at [facility] MAIN OR   • KNEE REPLACEMENT SURGERY Left    • LUMBAR EPIDURAL N/A 10/14/2014    Performed by Almita Gibbs MD at [address] [facility]     • TOTAL HIP REPLACEMENT Left 09/18/2019 shoulder pain s/p surgery 2019, last appt 3/3/21, subacromial injection, will need another surgery- still w/c, chronic LBP into back of thighs, waxes and wanes, avoids pain meds  NEURO: denies headaches, tremors, dizziness, numbness and weakness  PSYCHE: d bilateral low back pain without sciatica  S/p hip replacement, left  Status post left knee replacement  Regulo on cpap  Gastroesophageal reflux disease without esophagitis  Screening for prostate cancer  Screening, lipid  Laboratory exam ordered as part of ro and laying down to facilitate gastric emptying. Limit caffeine to 2 servings per day. Avoid spicy or acidic foods and liquids. Moderation of alcohol. Avoid nsaids and aspirin. May use Tylenol prn pain  Continue over-the-counter Prevacid    8.  Screenin

## 2021-03-27 NOTE — PATIENT INSTRUCTIONS
1. Preventative health care  I reviewed age-appropriate preventive health screen exams as well as immunizations. I would asked patient to consider a Shingrix vaccine at least a month after completing his Covid vaccination series.   Would recommend pneumoco 30.0-34. 9)  Discussed importance of lower carbohydrate food choices, avoidance of starches and eating behavior modification in the setting of restricted aerobic capacity    12.   Rotator cuff syndrome of left shoulder  Follow-up with orthopedics    Yoana Cornejo

## 2021-07-29 RX ORDER — LISINOPRIL 20 MG/1
20 TABLET ORAL DAILY
Qty: 90 TABLET | Refills: 0 | Status: SHIPPED | OUTPATIENT
Start: 2021-07-29 | End: 2021-11-01

## 2021-07-29 NOTE — TELEPHONE ENCOUNTER
Left detail message on identified voicemail stating pt is due for office visit     LOV: 7-    LAST LAB: 3-    LAST RX:  Medication Quantity Refills Start End   LISINOPRIL 20 MG Oral Tab 90 tablet 1 1/26/2021    Sig:   TAKE 1 TABLET(20 MG) BY

## 2021-10-08 ENCOUNTER — IMMUNIZATION (OUTPATIENT)
Dept: FAMILY MEDICINE CLINIC | Facility: CLINIC | Age: 66
End: 2021-10-08
Payer: COMMERCIAL

## 2021-10-08 DIAGNOSIS — Z23 NEED FOR INFLUENZA VACCINATION: Primary | ICD-10-CM

## 2021-10-08 PROCEDURE — 90471 IMMUNIZATION ADMIN: CPT | Performed by: NURSE PRACTITIONER

## 2021-10-08 PROCEDURE — 90686 IIV4 VACC NO PRSV 0.5 ML IM: CPT | Performed by: NURSE PRACTITIONER

## 2021-11-01 RX ORDER — LISINOPRIL 20 MG/1
20 TABLET ORAL DAILY
Qty: 90 TABLET | Refills: 0 | Status: SHIPPED | OUTPATIENT
Start: 2021-11-01 | End: 2022-01-31

## 2021-11-01 NOTE — TELEPHONE ENCOUNTER
Last refill: 7/29/21 #90 w/ 0 refills    Last OV: 3/27/21  Last labs: 3/27/21    No future appointments.

## 2022-01-31 ENCOUNTER — TELEPHONE (OUTPATIENT)
Dept: FAMILY MEDICINE CLINIC | Facility: CLINIC | Age: 67
End: 2022-01-31

## 2022-01-31 RX ORDER — LISINOPRIL 20 MG/1
20 TABLET ORAL DAILY
Qty: 90 TABLET | Refills: 0 | Status: SHIPPED | OUTPATIENT
Start: 2022-01-31

## 2022-01-31 NOTE — TELEPHONE ENCOUNTER
lisinopril 20 MG Oral Tab  REFILL TO [facility] MURRAY/[facility] TELLING PT. THEY HAVE SENT OVER MULTIPLE REQUEST WITH NO RESPONSE FROM OUR OFFICE. PT. IS ALMOST OUT. I DID INFORM PT. THERE WERE NOT REQUEST FROM [facility] IN HIS CHART.

## 2022-04-27 NOTE — TELEPHONE ENCOUNTER
Last oV: 3/27/21  Last labS: 3/27/21    No future appointments. Due for OV and labs-- [facility] sent.

## 2022-04-28 RX ORDER — LISINOPRIL 20 MG/1
20 TABLET ORAL DAILY
Qty: 90 TABLET | Refills: 0 | Status: SHIPPED | OUTPATIENT
Start: 2022-04-28

## 2022-05-18 ENCOUNTER — TELEPHONE (OUTPATIENT)
Dept: FAMILY MEDICINE CLINIC | Facility: CLINIC | Age: 67
End: 2022-05-18

## 2022-07-30 ENCOUNTER — OFFICE VISIT (OUTPATIENT)
Dept: FAMILY MEDICINE CLINIC | Facility: CLINIC | Age: 67
End: 2022-07-30
Payer: COMMERCIAL

## 2022-07-30 VITALS
SYSTOLIC BLOOD PRESSURE: 170 MMHG | BODY MASS INDEX: 32.57 KG/M2 | TEMPERATURE: 97 F | OXYGEN SATURATION: 99 % | WEIGHT: 232.63 LBS | DIASTOLIC BLOOD PRESSURE: 100 MMHG | HEIGHT: 71 IN | HEART RATE: 78 BPM

## 2022-07-30 DIAGNOSIS — Z96.652 STATUS POST LEFT KNEE REPLACEMENT: ICD-10-CM

## 2022-07-30 DIAGNOSIS — Z51.81 MEDICATION MONITORING ENCOUNTER: ICD-10-CM

## 2022-07-30 DIAGNOSIS — Z99.89 OSA ON CPAP: ICD-10-CM

## 2022-07-30 DIAGNOSIS — Z12.5 SCREENING FOR PROSTATE CANCER: ICD-10-CM

## 2022-07-30 DIAGNOSIS — G47.33 OSA ON CPAP: ICD-10-CM

## 2022-07-30 DIAGNOSIS — I10 ESSENTIAL HYPERTENSION: Primary | ICD-10-CM

## 2022-07-30 DIAGNOSIS — Z13.220 SCREENING, LIPID: ICD-10-CM

## 2022-07-30 DIAGNOSIS — M54.50 CHRONIC BILATERAL LOW BACK PAIN WITHOUT SCIATICA: ICD-10-CM

## 2022-07-30 DIAGNOSIS — K21.9 GASTROESOPHAGEAL REFLUX DISEASE WITHOUT ESOPHAGITIS: ICD-10-CM

## 2022-07-30 DIAGNOSIS — N52.9 VASCULOGENIC ERECTILE DYSFUNCTION, UNSPECIFIED VASCULOGENIC ERECTILE DYSFUNCTION TYPE: ICD-10-CM

## 2022-07-30 DIAGNOSIS — Z96.642 S/P HIP REPLACEMENT, LEFT: ICD-10-CM

## 2022-07-30 DIAGNOSIS — G89.29 CHRONIC BILATERAL LOW BACK PAIN WITHOUT SCIATICA: ICD-10-CM

## 2022-07-30 DIAGNOSIS — F17.200 TOBACCO DEPENDENCE: ICD-10-CM

## 2022-07-30 LAB
ALBUMIN SERPL-MCNC: 4 G/DL (ref 3.4–5)
ALBUMIN/GLOB SERPL: 1.2 {RATIO} (ref 1–2)
ALP LIVER SERPL-CCNC: 100 U/L
ALT SERPL-CCNC: 32 U/L
ANION GAP SERPL CALC-SCNC: 4 MMOL/L (ref 0–18)
AST SERPL-CCNC: 29 U/L (ref 15–37)
BILIRUB SERPL-MCNC: 0.6 MG/DL (ref 0.1–2)
BUN BLD-MCNC: 12 MG/DL (ref 7–18)
CALCIUM BLD-MCNC: 9.6 MG/DL (ref 8.5–10.1)
CHLORIDE SERPL-SCNC: 108 MMOL/L (ref 98–112)
CHOLEST SERPL-MCNC: 183 MG/DL (ref ?–200)
CO2 SERPL-SCNC: 26 MMOL/L (ref 21–32)
COMPLEXED PSA SERPL-MCNC: 1.41 NG/ML (ref ?–4)
CREAT BLD-MCNC: 0.89 MG/DL
FASTING PATIENT LIPID ANSWER: NO
FASTING STATUS PATIENT QL REPORTED: NO
GLOBULIN PLAS-MCNC: 3.4 G/DL (ref 2.8–4.4)
GLUCOSE BLD-MCNC: 101 MG/DL (ref 70–99)
HDLC SERPL-MCNC: 53 MG/DL (ref 40–59)
LDLC SERPL CALC-MCNC: 118 MG/DL (ref ?–100)
NONHDLC SERPL-MCNC: 130 MG/DL (ref ?–130)
OSMOLALITY SERPL CALC.SUM OF ELEC: 286 MOSM/KG (ref 275–295)
POTASSIUM SERPL-SCNC: 5 MMOL/L (ref 3.5–5.1)
PROT SERPL-MCNC: 7.4 G/DL (ref 6.4–8.2)
SODIUM SERPL-SCNC: 138 MMOL/L (ref 136–145)
TRIGL SERPL-MCNC: 63 MG/DL (ref 30–149)
VLDLC SERPL CALC-MCNC: 11 MG/DL (ref 0–30)

## 2022-07-30 PROCEDURE — 80053 COMPREHEN METABOLIC PANEL: CPT | Performed by: FAMILY MEDICINE

## 2022-07-30 PROCEDURE — 3080F DIAST BP >= 90 MM HG: CPT | Performed by: FAMILY MEDICINE

## 2022-07-30 PROCEDURE — 84153 ASSAY OF PSA TOTAL: CPT | Performed by: FAMILY MEDICINE

## 2022-07-30 PROCEDURE — 80061 LIPID PANEL: CPT | Performed by: FAMILY MEDICINE

## 2022-07-30 PROCEDURE — 99214 OFFICE O/P EST MOD 30 MIN: CPT | Performed by: FAMILY MEDICINE

## 2022-07-30 PROCEDURE — 3008F BODY MASS INDEX DOCD: CPT | Performed by: FAMILY MEDICINE

## 2022-07-30 PROCEDURE — 3077F SYST BP >= 140 MM HG: CPT | Performed by: FAMILY MEDICINE

## 2022-07-30 RX ORDER — TADALAFIL 5 MG/1
5 TABLET ORAL
Qty: 9 TABLET | Refills: 0 | Status: SHIPPED | OUTPATIENT
Start: 2022-07-30

## 2022-07-30 RX ORDER — LISINOPRIL 30 MG/1
30 TABLET ORAL DAILY
Qty: 90 TABLET | Refills: 1 | Status: SHIPPED | OUTPATIENT
Start: 2022-07-30

## 2022-07-30 NOTE — PATIENT INSTRUCTIONS
I reviewed goals for blood pressure as well as conservative management of hypertension including sodium restriction, daily aerobic activity, alcohol moderation, smoking cessation, and maintaining ideal body weight. Will increase lisinopril to 30 mg daily.   Of asked patient to begin monitoring his blood pressure at least 3-4 times weekly taking an average of 3 consecutive readings at various times of the day and reporting his numbers over the next several weeks  I have asked patient to moderate his alcohol intake on the weekends by 50%  DISCUSSED ED, CAUSES, CONTRIBUTING FACTORS IE MEDS, MEDICAL CONDITIONS, ETC, MANAGEMENT OPTIONS, RISKS, BENEFITS, SIDE EFFECTS, ANTICIPATED RESPONSE ETC  WILL GIVE tadalafil 5 mg as needed, potential side effects discussed  Discussed importance of nightly use of CPAP as well as the cardiovascular complications of untreated disease  Reviewed age-appropriate immunizations, I have asked patient to check with insurance for coverage of the shingles vaccine  We will check labs today

## 2022-08-02 DIAGNOSIS — Z51.81 MEDICATION MONITORING ENCOUNTER: ICD-10-CM

## 2022-08-02 DIAGNOSIS — Z12.5 SCREENING FOR PROSTATE CANCER: ICD-10-CM

## 2022-08-02 DIAGNOSIS — Z13.220 SCREENING, LIPID: Primary | ICD-10-CM

## 2022-10-20 ENCOUNTER — TELEPHONE (OUTPATIENT)
Dept: FAMILY MEDICINE CLINIC | Facility: CLINIC | Age: 67
End: 2022-10-20

## 2022-10-20 NOTE — TELEPHONE ENCOUNTER
Pt. Asking for Laura Damon to call him back. He thinks he just needs a referral to a spine spec. And not come in to see Dr. Stepan Cerda. He has appt. Set for next week but he really is just wanting the ref. To spine spec.

## 2022-10-20 NOTE — TELEPHONE ENCOUNTER
Spoke with pt. C/o chronic LBP, getting worse. Pt saw Dr. Pato Nguyen on 7/26/22 for f/u on L hip replacement and he addressed the back pain. Had xrays done that day (not under imagine tab, but results are in a 7/26/22 ancillary procedure encounter). Dr. Pato Nguyen recommended he see a \"spine specialist\". Pt would like a referral to someone. He wasn't sure if he needed to see Dr. Connie Rivera first, so he does have an appt scheduled with you on 10/26. Should he keep this appt with you?   Or can you make a recommendation/ referral?

## 2022-10-21 NOTE — TELEPHONE ENCOUNTER
Detailed message left with patient regarding Dr. Zenia Schwarz recommendations. Advised to call back if any questions or concerns.

## 2022-10-26 ENCOUNTER — OFFICE VISIT (OUTPATIENT)
Dept: FAMILY MEDICINE CLINIC | Facility: CLINIC | Age: 67
End: 2022-10-26
Payer: COMMERCIAL

## 2022-10-26 VITALS
DIASTOLIC BLOOD PRESSURE: 80 MMHG | HEIGHT: 71 IN | SYSTOLIC BLOOD PRESSURE: 134 MMHG | BODY MASS INDEX: 32.9 KG/M2 | OXYGEN SATURATION: 96 % | TEMPERATURE: 98 F | RESPIRATION RATE: 18 BRPM | HEART RATE: 65 BPM | WEIGHT: 235 LBS

## 2022-10-26 DIAGNOSIS — Z96.652 STATUS POST LEFT KNEE REPLACEMENT: ICD-10-CM

## 2022-10-26 DIAGNOSIS — Z96.642 S/P HIP REPLACEMENT, LEFT: ICD-10-CM

## 2022-10-26 DIAGNOSIS — M48.062 LUMBAR STENOSIS WITH NEUROGENIC CLAUDICATION: Primary | ICD-10-CM

## 2022-10-26 DIAGNOSIS — Z23 NEED FOR VACCINATION: ICD-10-CM

## 2022-10-26 PROCEDURE — 90662 IIV NO PRSV INCREASED AG IM: CPT | Performed by: FAMILY MEDICINE

## 2022-10-26 PROCEDURE — 90471 IMMUNIZATION ADMIN: CPT | Performed by: FAMILY MEDICINE

## 2022-10-26 PROCEDURE — 99214 OFFICE O/P EST MOD 30 MIN: CPT | Performed by: FAMILY MEDICINE

## 2022-10-26 PROCEDURE — 3079F DIAST BP 80-89 MM HG: CPT | Performed by: FAMILY MEDICINE

## 2022-10-26 PROCEDURE — 3008F BODY MASS INDEX DOCD: CPT | Performed by: FAMILY MEDICINE

## 2022-10-26 PROCEDURE — 90472 IMMUNIZATION ADMIN EACH ADD: CPT | Performed by: FAMILY MEDICINE

## 2022-10-26 PROCEDURE — 3075F SYST BP GE 130 - 139MM HG: CPT | Performed by: FAMILY MEDICINE

## 2022-10-26 PROCEDURE — 90677 PCV20 VACCINE IM: CPT | Performed by: FAMILY MEDICINE

## 2022-10-26 NOTE — PATIENT INSTRUCTIONS
I reviewed last orthopedic note and imaging reports. I reviewed orthopedic recommendations for therapy and MRI. Referral placed for physical therapy however patient states he will be difficult with his job as he is only in the area 1 day a week. Would recommend MRI with and without contrast due to previous back surgery. Patient will schedule through [facility]  Discussed likely positional relationship to symptoms. Reviewed age-appropriate immunizations.   PCV-20 and high-dose flu vaccine provided  Will contact [facility] for patient's shingles vaccine dates

## 2022-10-27 ENCOUNTER — TELEPHONE (OUTPATIENT)
Dept: PHYSICAL THERAPY | Facility: HOSPITAL | Age: 67
End: 2022-10-27

## 2022-10-28 RX ORDER — LISINOPRIL 30 MG/1
TABLET ORAL
Qty: 90 TABLET | Refills: 1 | OUTPATIENT
Start: 2022-10-28

## 2022-10-28 NOTE — TELEPHONE ENCOUNTER
Called pharmacy and confirmed patient just picked up 90 day supply. Refill is not needed at this time.    Riky ANTOINE MA, 10/28/22, 12:22 PM

## 2022-10-31 ENCOUNTER — TELEPHONE (OUTPATIENT)
Dept: PHYSICAL THERAPY | Facility: HOSPITAL | Age: 67
End: 2022-10-31

## 2022-11-01 ENCOUNTER — OFFICE VISIT (OUTPATIENT)
Dept: PHYSICAL THERAPY | Age: 67
End: 2022-11-01
Attending: FAMILY MEDICINE
Payer: COMMERCIAL

## 2022-11-01 PROCEDURE — 97110 THERAPEUTIC EXERCISES: CPT

## 2022-11-01 PROCEDURE — 97162 PT EVAL MOD COMPLEX 30 MIN: CPT

## 2022-11-07 ENCOUNTER — TELEPHONE (OUTPATIENT)
Dept: FAMILY MEDICINE CLINIC | Facility: CLINIC | Age: 67
End: 2022-11-07

## 2022-11-07 NOTE — TELEPHONE ENCOUNTER
Rec'd fax from [address] [place] [address]. MRI of lower spinal canal before and after contrast approved effective 11/7/22 expires 1/5/23. Request ID: [id number]    LM for pt advised MRI was approved and OK to schedule if not done already. Asked pt to CB with any questions.

## 2022-11-15 DIAGNOSIS — M48.062 LUMBAR STENOSIS WITH NEUROGENIC CLAUDICATION: Primary | ICD-10-CM

## 2022-11-15 DIAGNOSIS — R93.7 ABNORMAL MRI, SPINE: ICD-10-CM

## 2022-11-15 DIAGNOSIS — M48.061 NEURAL FORAMINAL STENOSIS OF LUMBAR SPINE: ICD-10-CM

## 2022-12-09 ENCOUNTER — TELEPHONE (OUTPATIENT)
Dept: NEUROLOGY | Facility: CLINIC | Age: 67
End: 2022-12-09

## 2022-12-09 ENCOUNTER — OFFICE VISIT (OUTPATIENT)
Dept: PAIN CLINIC | Facility: CLINIC | Age: 67
End: 2022-12-09
Payer: COMMERCIAL

## 2022-12-09 VITALS — OXYGEN SATURATION: 96 % | HEART RATE: 74 BPM | SYSTOLIC BLOOD PRESSURE: 126 MMHG | DIASTOLIC BLOOD PRESSURE: 76 MMHG

## 2022-12-09 DIAGNOSIS — M48.062 SPINAL STENOSIS OF LUMBAR REGION WITH NEUROGENIC CLAUDICATION: Primary | ICD-10-CM

## 2022-12-09 DIAGNOSIS — M51.36 DDD (DEGENERATIVE DISC DISEASE), LUMBAR: Primary | ICD-10-CM

## 2022-12-09 PROCEDURE — 3078F DIAST BP <80 MM HG: CPT | Performed by: ANESTHESIOLOGY

## 2022-12-09 PROCEDURE — 3074F SYST BP LT 130 MM HG: CPT | Performed by: ANESTHESIOLOGY

## 2022-12-09 PROCEDURE — 99204 OFFICE O/P NEW MOD 45 MIN: CPT | Performed by: ANESTHESIOLOGY

## 2022-12-09 RX ORDER — METHYLPREDNISOLONE 4 MG/1
TABLET ORAL
Qty: 1 EACH | Refills: 0 | Status: SHIPPED | OUTPATIENT
Start: 2022-12-09

## 2022-12-09 NOTE — TELEPHONE ENCOUNTER
Prior authorization request completed for: JAMES   Authorization # [id number]  Authorization dates: 12/9/2022 - 1/7/2023  CPT codes approved: 35242  Number of visits/dates of service approved: 1  Physician: Dr. Miguel Ozuna   Location: [facility]     Patient can be scheduled. Routed to Navigator.

## 2022-12-13 ENCOUNTER — TELEPHONE (OUTPATIENT)
Dept: FAMILY MEDICINE CLINIC | Facility: CLINIC | Age: 67
End: 2022-12-13

## 2022-12-13 NOTE — TELEPHONE ENCOUNTER
GOING TO BE HAVING EPIDURAL INJECTION IN BACK @ [facility] WITH DR INIGUEZ, CALLING TO SEE IF OUR OFFICE CAN GIVE HIM THE CODE SO HE CAN FIND OUT HOW MUCH IT IS GOING TO COST?  ADVISED MAY NEED TO CALL DR INIGUEZ'S OFFICE, PT WANTS TO TALK TO NURSE, CALL & SPEAK WITH HIS WIFE

## 2022-12-13 NOTE — TELEPHONE ENCOUNTER
Spoke with pt's wife.  Advised she will need to call Dr. Samantha Swift [facility] to discuss/ get CPT codes/ discuss billing, etc.

## 2022-12-16 ENCOUNTER — TELEPHONE (OUTPATIENT)
Dept: PHYSICAL THERAPY | Facility: HOSPITAL | Age: 67
End: 2022-12-16

## 2022-12-19 ENCOUNTER — TELEPHONE (OUTPATIENT)
Dept: NEUROLOGY | Facility: CLINIC | Age: 67
End: 2022-12-19

## 2022-12-19 NOTE — TELEPHONE ENCOUNTER
Pt calling requesting to reschedule his upcoming injection on 12/22/22, pt would like to reschedule to the first week in January before the 7th due to the referral expiring.

## 2022-12-23 ENCOUNTER — APPOINTMENT (OUTPATIENT)
Dept: PHYSICAL THERAPY | Age: 67
End: 2022-12-23
Attending: FAMILY MEDICINE
Payer: COMMERCIAL

## 2022-12-23 ENCOUNTER — TELEPHONE (OUTPATIENT)
Dept: PHYSICAL THERAPY | Facility: HOSPITAL | Age: 67
End: 2022-12-23

## 2022-12-29 ENCOUNTER — OFFICE VISIT (OUTPATIENT)
Dept: PHYSICAL THERAPY | Age: 67
End: 2022-12-29
Attending: FAMILY MEDICINE
Payer: COMMERCIAL

## 2022-12-29 PROCEDURE — 97140 MANUAL THERAPY 1/> REGIONS: CPT

## 2022-12-29 PROCEDURE — 97110 THERAPEUTIC EXERCISES: CPT

## 2023-01-05 ENCOUNTER — HOSPITAL ENCOUNTER (OUTPATIENT)
Facility: HOSPITAL | Age: 68
Setting detail: HOSPITAL OUTPATIENT SURGERY
Discharge: HOME | End: 2023-01-05
Attending: ANESTHESIOLOGY | Admitting: ANESTHESIOLOGY
Payer: COMMERCIAL

## 2023-01-05 ENCOUNTER — APPOINTMENT (OUTPATIENT)
Dept: GENERAL RADIOLOGY | Facility: HOSPITAL | Age: 68
End: 2023-01-05
Attending: ANESTHESIOLOGY
Payer: COMMERCIAL

## 2023-01-05 VITALS
TEMPERATURE: 98 F | RESPIRATION RATE: 20 BRPM | OXYGEN SATURATION: 94 % | DIASTOLIC BLOOD PRESSURE: 93 MMHG | HEART RATE: 78 BPM | SYSTOLIC BLOOD PRESSURE: 159 MMHG

## 2023-01-05 PROCEDURE — 3E0S3BZ INTRODUCTION OF ANESTHETIC AGENT INTO EPIDURAL SPACE, PERCUTANEOUS APPROACH: ICD-10-PCS | Performed by: ANESTHESIOLOGY

## 2023-01-05 PROCEDURE — 62323 NJX INTERLAMINAR LMBR/SAC: CPT | Performed by: ANESTHESIOLOGY

## 2023-01-05 PROCEDURE — 3E0S33Z INTRODUCTION OF ANTI-INFLAMMATORY INTO EPIDURAL SPACE, PERCUTANEOUS APPROACH: ICD-10-PCS | Performed by: ANESTHESIOLOGY

## 2023-01-05 RX ORDER — DEXAMETHASONE SODIUM PHOSPHATE 10 MG/ML
INJECTION, SOLUTION INTRAMUSCULAR; INTRAVENOUS
Status: DISCONTINUED | OUTPATIENT
Start: 2023-01-05 | End: 2023-01-05

## 2023-01-05 RX ORDER — LIDOCAINE HYDROCHLORIDE 10 MG/ML
INJECTION, SOLUTION EPIDURAL; INFILTRATION; INTRACAUDAL; PERINEURAL
Status: DISCONTINUED | OUTPATIENT
Start: 2023-01-05 | End: 2023-01-05

## 2023-01-05 RX ORDER — SODIUM CHLORIDE 9 MG/ML
INJECTION INTRAVENOUS
Status: DISCONTINUED | OUTPATIENT
Start: 2023-01-05 | End: 2023-01-05

## 2023-01-05 NOTE — DISCHARGE INSTRUCTIONS
Home Care Instructions Following Your Pain Procedure     Lyn Guzman,  It has been a pleasure to have you as our patient. To help you at home, you must follow these general discharge instructions. We will review these with you before you are discharged. It is our hope that you have a complete and uneventful recovery from our procedure. General Instructions:  What to Expect:  Bandages from your procedure today can be removed when you get home. Please avoid soaking and/or swimming for 24 hours. Showering is okay  It is normal to have increased pain symptoms and/or pain at injection site for up to 3-5 days after procedure, you can use heat or ice (20 minutes on 20 minutes off) for comfort. You may experience some temporary side effects which may include restlessness or insomnia, flushing of the face, or heart palpitations. Please contact the provider if these symptoms do not resolve within 3-4 days. Lightheadedness or nausea may occur and should resolve within 24 to 48 hours. If you develop a headache after treatment, rest, drink fluids (with caffeine, if possible) and take mild over-the-counter pain medication. If the headache does not improve with the above treatment, contact the physician. Home Medications:  Resume all previously prescribed medication. Please avoid taking NSAIDs (Non-Steriodal Anti-Inflammatory Drugs) such as:  Ibuprofen ( Advil, Motrin) Aleve (Naproxen), Diclofenac, Meloxicam for 6 hours after procedure. If you are on Coumadin (Warfarin) or any other anti-coagulant (or \"blood thinning\") medication such as Plavix (Clopidogrel), Xarelto (Rivaroxaban), Eliquis (Apixaban), Effient (Prasugrel) etc., restart on the following day from the procedure unless otherwise directed by your provider. If you are a diabetic, please increase the frequency of your glucose monitoring after the procedure as steroids may cause a temporary (2-3 day) increase in your blood sugar.   Contact your primary care physician if your blood sugar remains elevated as you may require some medication adjustment. Diet:  Resume your regular diet as tolerated. Activity: We recommend that you relax and rest during the rest of your procedure day. If you feel weakness in your arms or legs do not drive. Follow-up Appointment  Please schedule a follow-up visit within 3 to 4 weeks after your last procedure date. Question or Concerns:  Feel free to call our office with any questions or concerns at [phone] (option #2)    Tony Serna  Thank you for coming to [facility] for your procedure. The nurses try very hard to make sure you receive the best care possible. Your trust in them as well as us is greatly appreciated.     Thanks so much,   Dr. Krishnan Lat

## 2023-01-05 NOTE — OPERATIVE REPORT
[facility]  Operative Report  2023     [facility] Patient Status:  Hospital Outpatient Surgery    1955 MRN [id number]   Location [address] [address] Attending Danny Jacobs MD   [address] Day # 0 PCP Nichole Hunter. Michelle Jesus DO     Indication: Anjelica Mortensen is a 78year old male lumbar degenerative disc disease    Preoperative Diagnosis:  DDD (degenerative disc disease), lumbar [M51.36]    Postoperative Diagnosis: Same as above. Procedure performed: LUMBAR INTERLAMINAR EPIDURAL INJECTION with local     Anesthesia: Local     EBL: Less than 1 ml. Procedure Description:  After reviewing the patient's history and performing a focused physical examination, the diagnosis and positive previous diagnostic tests were confirmed and contraindications such as infection and coagulopathy were ruled out. Following review of allergies and potential side effects and complications, including but not necessarily limited to infection, allergic reaction, local tissue breakdown, nerve injury, post-dural puncture headache and paresis, the patient consented for the procedure. The patient was brought to the procedure room in prone position. After sterile prep lumbar spine, the L5-S1 interspace was identified with the help of fluoroscopy. Local anesthetic was given by a 25 gauge 1.5 inch needle with 1% lidocaine in that space level. Thereafter, a 20 gauge Tuohy needle was introduced and advanced under fluoroscopy. The epidural space was accessed by using loss of resistance to air technique. The needle position was confirmed with AP and lateral view under fluoroscopy. Thereafter, dexamethasone 10 mg with normal saline of 5 mL in total volume of 6 mL was injected through the Tuohy needle. The needle was flushed with 1 mL lidocaine. The needle was withdrawn with the stylet intact in situ. The needle's tip was intact.   The patient tolerated the procedure very well without significant immediate complication. The patient's back was cleaned and sterile dressing was applied. The patient was discharged with an instruction to a responsible adult after discharge criteria were met. The patient was advised to contact us should any problems happen. The patient was also informed to go to the emergency room immediately if experiencing severe numbness or weakness in the extremities or experiencing bowel or bladder incontinence. Complications: None. Follow up: The patient was followed in the pain clinic as needed basis.           Norberto Chavez MD

## 2023-01-06 ENCOUNTER — TELEPHONE (OUTPATIENT)
Dept: NEUROLOGY | Facility: CLINIC | Age: 68
End: 2023-01-06

## 2023-01-06 ENCOUNTER — APPOINTMENT (OUTPATIENT)
Dept: PHYSICAL THERAPY | Age: 68
End: 2023-01-06
Attending: FAMILY MEDICINE
Payer: COMMERCIAL

## 2023-01-06 ENCOUNTER — TELEPHONE (OUTPATIENT)
Dept: PHYSICAL THERAPY | Facility: HOSPITAL | Age: 68
End: 2023-01-06

## 2023-01-06 NOTE — TELEPHONE ENCOUNTER
Patient returning call - patient mentioned he is having numbness in legs still and does not know if he should cancel chiro appointment for today. Advised patient that chiro appoitment should be cancelled for today and should rest this weekend.  Also reminded patient that he has F/U Appoitment with  on 01/20/2023 @ 245PM.  Patient VU

## 2023-01-13 ENCOUNTER — OFFICE VISIT (OUTPATIENT)
Dept: PHYSICAL THERAPY | Age: 68
End: 2023-01-13
Attending: FAMILY MEDICINE
Payer: COMMERCIAL

## 2023-01-13 PROCEDURE — 97110 THERAPEUTIC EXERCISES: CPT

## 2023-01-13 PROCEDURE — 97140 MANUAL THERAPY 1/> REGIONS: CPT

## 2023-01-20 ENCOUNTER — OFFICE VISIT (OUTPATIENT)
Dept: PAIN CLINIC | Facility: CLINIC | Age: 68
End: 2023-01-20
Payer: COMMERCIAL

## 2023-01-20 ENCOUNTER — OFFICE VISIT (OUTPATIENT)
Dept: PHYSICAL THERAPY | Age: 68
End: 2023-01-20
Attending: FAMILY MEDICINE
Payer: COMMERCIAL

## 2023-01-20 VITALS
BODY MASS INDEX: 33 KG/M2 | DIASTOLIC BLOOD PRESSURE: 84 MMHG | WEIGHT: 235 LBS | HEART RATE: 68 BPM | OXYGEN SATURATION: 95 % | SYSTOLIC BLOOD PRESSURE: 148 MMHG

## 2023-01-20 DIAGNOSIS — M48.062 SPINAL STENOSIS OF LUMBAR REGION WITH NEUROGENIC CLAUDICATION: Primary | ICD-10-CM

## 2023-01-20 PROCEDURE — 97140 MANUAL THERAPY 1/> REGIONS: CPT

## 2023-01-20 PROCEDURE — 97110 THERAPEUTIC EXERCISES: CPT

## 2023-01-20 PROCEDURE — 3077F SYST BP >= 140 MM HG: CPT | Performed by: ANESTHESIOLOGY

## 2023-01-20 PROCEDURE — 3079F DIAST BP 80-89 MM HG: CPT | Performed by: ANESTHESIOLOGY

## 2023-01-20 PROCEDURE — 99214 OFFICE O/P EST MOD 30 MIN: CPT | Performed by: ANESTHESIOLOGY

## 2023-01-20 NOTE — PROGRESS NOTES
Last procedure: LUMBAR INTERLAMINAR EPIDURAL INJECTION with local  Date: 01/05/23  Percentage of relief obtained: after the first 8 days - 30%  Duration of relief: current    Current Pain Score: 2

## 2023-01-23 ENCOUNTER — TELEPHONE (OUTPATIENT)
Dept: FAMILY MEDICINE CLINIC | Facility: CLINIC | Age: 68
End: 2023-01-23

## 2023-01-23 RX ORDER — LISINOPRIL 30 MG/1
30 TABLET ORAL DAILY
Qty: 90 TABLET | Refills: 0 | Status: SHIPPED | OUTPATIENT
Start: 2023-01-23

## 2023-01-25 DIAGNOSIS — M54.50 LOW BACK PAIN, UNSPECIFIED BACK PAIN LATERALITY, UNSPECIFIED CHRONICITY, UNSPECIFIED WHETHER SCIATICA PRESENT: Primary | ICD-10-CM

## 2023-01-26 ENCOUNTER — TELEPHONE (OUTPATIENT)
Dept: ORTHOPEDICS CLINIC | Facility: CLINIC | Age: 68
End: 2023-01-26

## 2023-01-26 ENCOUNTER — HOSPITAL ENCOUNTER (OUTPATIENT)
Dept: GENERAL RADIOLOGY | Age: 68
Discharge: HOME | End: 2023-01-26
Attending: STUDENT IN AN ORGANIZED HEALTH CARE EDUCATION/TRAINING PROGRAM
Payer: COMMERCIAL

## 2023-01-26 ENCOUNTER — OFFICE VISIT (OUTPATIENT)
Dept: ORTHOPEDICS CLINIC | Facility: CLINIC | Age: 68
End: 2023-01-26
Payer: COMMERCIAL

## 2023-01-26 VITALS — OXYGEN SATURATION: 95 % | HEIGHT: 73 IN | BODY MASS INDEX: 29.69 KG/M2 | WEIGHT: 224 LBS | HEART RATE: 87 BPM

## 2023-01-26 DIAGNOSIS — M48.062 LUMBAR STENOSIS WITH NEUROGENIC CLAUDICATION: Primary | ICD-10-CM

## 2023-01-26 DIAGNOSIS — M54.50 LOW BACK PAIN, UNSPECIFIED BACK PAIN LATERALITY, UNSPECIFIED CHRONICITY, UNSPECIFIED WHETHER SCIATICA PRESENT: ICD-10-CM

## 2023-01-26 PROCEDURE — 99205 OFFICE O/P NEW HI 60 MIN: CPT | Performed by: STUDENT IN AN ORGANIZED HEALTH CARE EDUCATION/TRAINING PROGRAM

## 2023-01-26 PROCEDURE — 3008F BODY MASS INDEX DOCD: CPT | Performed by: STUDENT IN AN ORGANIZED HEALTH CARE EDUCATION/TRAINING PROGRAM

## 2023-01-26 PROCEDURE — 72100 X-RAY EXAM L-S SPINE 2/3 VWS: CPT | Performed by: STUDENT IN AN ORGANIZED HEALTH CARE EDUCATION/TRAINING PROGRAM

## 2023-01-26 NOTE — PROGRESS NOTES
SURGERY SCHEDULING SHEET    Alexander Diana  12/22/1955  [id number]    Procedure: L4, L5 Lumbar Decompression ([id number], [id number])    Diagnosis:  No primary diagnosis found.     Anesthesia: General    Length of Surgery: 3 hrs    Disposition: Inpatient procedure    Assist: Kandi Prabhakar/Taqueria Bauer    OR Table: Travon Dahl    Position: Prone    Implant:  None    C-Arm: Yes    Special Equipment: Jaime Mullet    Neuromonitoring: None    Pre-op Testing: PER ANESTHESIA GUIDELINES    Clearance: OTHER:  PCP    Post op: 2 weeks post op    Home health: Jose Miguel Gambino MD  [address] Orthopedic Surgery  Phone: [phone]  Fax: [phone]

## 2023-01-27 ENCOUNTER — APPOINTMENT (OUTPATIENT)
Dept: PHYSICAL THERAPY | Age: 68
End: 2023-01-27
Attending: FAMILY MEDICINE
Payer: COMMERCIAL

## 2023-01-30 ENCOUNTER — TELEPHONE (OUTPATIENT)
Dept: FAMILY MEDICINE CLINIC | Facility: CLINIC | Age: 68
End: 2023-01-30

## 2023-01-30 NOTE — TELEPHONE ENCOUNTER
Orders not rec'd as of 3:32 pm on 1/30/23. Fax coversheet sent to Dr. Summer Lomeli office @ [phone] asking for pre-op orders.

## 2023-02-01 NOTE — TELEPHONE ENCOUNTER
Order's rec'd (placed in blue folder)    PC to pt to schedule pre-op. LM for pt to CB to schedule pre-op.

## 2023-02-03 ENCOUNTER — APPOINTMENT (OUTPATIENT)
Dept: PHYSICAL THERAPY | Age: 68
End: 2023-02-03
Attending: FAMILY MEDICINE
Payer: COMMERCIAL

## 2023-02-07 ENCOUNTER — TELEPHONE (OUTPATIENT)
Dept: ORTHOPEDICS CLINIC | Facility: CLINIC | Age: 68
End: 2023-02-07

## 2023-02-07 NOTE — TELEPHONE ENCOUNTER
Rosa, from [employer], called in regards to other pre-op orders for patient. Ferdinand Arnold stated she is unsure why PTINR was included in patient's pre-op info. Pre admission can be reached at [phone].

## 2023-02-07 NOTE — TELEPHONE ENCOUNTER
CALLED AND SPOKE WITH ALEXX FROM [facility], I INFORMED HER THAT THE PT/INR IS A STANDARD ORDER THAT DR. Kevin Marques PREFERS. SHE STATES THAT THE PATIENT LIVES FAR AND WOULD PREFER TO HAVE THE TESTING DONE CLOSER TO HIM. I DID INFORM HER THAT THIS LAB IS PER DR. Kevin Marques, SHE STATES THAT SHE WOULD INFORM THE PATIENT OF THIS.

## 2023-02-08 ENCOUNTER — TELEPHONE (OUTPATIENT)
Dept: ORTHOPEDICS CLINIC | Facility: CLINIC | Age: 68
End: 2023-02-08

## 2023-02-08 NOTE — TELEPHONE ENCOUNTER
Pre-Admission testing called for more clarification on other pre-op tests that were ordered by provider. Meena Sepulveda can be reached at [phone].

## 2023-02-10 ENCOUNTER — APPOINTMENT (OUTPATIENT)
Dept: PHYSICAL THERAPY | Age: 68
End: 2023-02-10
Attending: FAMILY MEDICINE
Payer: COMMERCIAL

## 2023-02-10 ENCOUNTER — PATIENT MESSAGE (OUTPATIENT)
Dept: ORTHOPEDICS CLINIC | Facility: CLINIC | Age: 68
End: 2023-02-10

## 2023-02-13 NOTE — TELEPHONE ENCOUNTER
From: John Thomas  To: Negrito Cerna MD  Sent: 2/10/2023 1:09 PM CST  Subject: Denton Berkowitz-this is Jelani's wife, and I am hoping you can give us some best guess information as I know it can not be determined 100% until after surgery. When Doe saw you, you had advised him it should be no problem to proceed with our scheduled March 18 vacation-(flight with cruise). However, that was when surgery was 2/22 and 1 area, now it is scheduled 3/1 and 2 areas. Do you think I should cancel our trip with it being only 17 days after surgery? If you think it could still be possible, is there any chance we could come slightly earlier for the follow up appt? Now scheduled 3/17 which is less then 24 hour before our trip. Any guidance will be appreciated.

## 2023-02-15 NOTE — TELEPHONE ENCOUNTER
PATIENT IS REQUESTING [address] [facility] TO BE ORDERED.      [address] [facility] ENTERED

## 2023-02-17 ENCOUNTER — APPOINTMENT (OUTPATIENT)
Dept: PHYSICAL THERAPY | Age: 68
End: 2023-02-17
Attending: FAMILY MEDICINE
Payer: COMMERCIAL

## 2023-02-24 ENCOUNTER — OFFICE VISIT (OUTPATIENT)
Dept: FAMILY MEDICINE CLINIC | Facility: CLINIC | Age: 68
End: 2023-02-24
Payer: COMMERCIAL

## 2023-02-24 ENCOUNTER — APPOINTMENT (OUTPATIENT)
Dept: PHYSICAL THERAPY | Age: 68
End: 2023-02-24
Attending: FAMILY MEDICINE
Payer: COMMERCIAL

## 2023-02-24 ENCOUNTER — LABORATORY ENCOUNTER (OUTPATIENT)
Dept: LAB | Age: 68
End: 2023-02-24
Attending: FAMILY MEDICINE
Payer: COMMERCIAL

## 2023-02-24 VITALS
WEIGHT: 238 LBS | OXYGEN SATURATION: 96 % | HEART RATE: 95 BPM | HEIGHT: 72 IN | TEMPERATURE: 97 F | DIASTOLIC BLOOD PRESSURE: 86 MMHG | SYSTOLIC BLOOD PRESSURE: 146 MMHG | BODY MASS INDEX: 32.23 KG/M2

## 2023-02-24 DIAGNOSIS — Z99.89 OSA ON CPAP: ICD-10-CM

## 2023-02-24 DIAGNOSIS — Z96.652 STATUS POST LEFT KNEE REPLACEMENT: ICD-10-CM

## 2023-02-24 DIAGNOSIS — Z01.818 PRE-OP EVALUATION: Primary | ICD-10-CM

## 2023-02-24 DIAGNOSIS — I10 ESSENTIAL HYPERTENSION: ICD-10-CM

## 2023-02-24 DIAGNOSIS — M48.062 LUMBAR STENOSIS WITH NEUROGENIC CLAUDICATION: ICD-10-CM

## 2023-02-24 DIAGNOSIS — Z01.818 PRE-OP EVALUATION: ICD-10-CM

## 2023-02-24 DIAGNOSIS — K21.9 GASTROESOPHAGEAL REFLUX DISEASE WITHOUT ESOPHAGITIS: ICD-10-CM

## 2023-02-24 DIAGNOSIS — Z96.642 S/P HIP REPLACEMENT, LEFT: ICD-10-CM

## 2023-02-24 DIAGNOSIS — G47.33 OSA ON CPAP: ICD-10-CM

## 2023-02-24 LAB
ANION GAP SERPL CALC-SCNC: 2 MMOL/L (ref 0–18)
ATRIAL RATE: 85 BPM
BASOPHILS # BLD AUTO: 0.04 X10(3) UL (ref 0–0.2)
BASOPHILS NFR BLD AUTO: 0.5 %
BUN BLD-MCNC: 14 MG/DL (ref 7–18)
CALCIUM BLD-MCNC: 9.4 MG/DL (ref 8.5–10.1)
CHLORIDE SERPL-SCNC: 110 MMOL/L (ref 98–112)
CO2 SERPL-SCNC: 28 MMOL/L (ref 21–32)
CREAT BLD-MCNC: 0.85 MG/DL
EOSINOPHIL # BLD AUTO: 0.23 X10(3) UL (ref 0–0.7)
EOSINOPHIL NFR BLD AUTO: 2.6 %
ERYTHROCYTE [DISTWIDTH] IN BLOOD BY AUTOMATED COUNT: 13.8 %
FASTING STATUS PATIENT QL REPORTED: NO
GFR SERPLBLD BASED ON 1.73 SQ M-ARVRAT: 96 ML/MIN/1.73M2 (ref 60–?)
GLUCOSE BLD-MCNC: 93 MG/DL (ref 70–99)
HCT VFR BLD AUTO: 45.8 %
HGB BLD-MCNC: 14.9 G/DL
IMM GRANULOCYTES # BLD AUTO: 0.04 X10(3) UL (ref 0–1)
IMM GRANULOCYTES NFR BLD: 0.5 %
LYMPHOCYTES # BLD AUTO: 1.44 X10(3) UL (ref 1–4)
LYMPHOCYTES NFR BLD AUTO: 16.3 %
MCH RBC QN AUTO: 30.5 PG (ref 26–34)
MCHC RBC AUTO-ENTMCNC: 32.5 G/DL (ref 31–37)
MCV RBC AUTO: 93.9 FL
MONOCYTES # BLD AUTO: 1.03 X10(3) UL (ref 0.1–1)
MONOCYTES NFR BLD AUTO: 11.7 %
NEUTROPHILS # BLD AUTO: 6.05 X10 (3) UL (ref 1.5–7.7)
NEUTROPHILS # BLD AUTO: 6.05 X10(3) UL (ref 1.5–7.7)
NEUTROPHILS NFR BLD AUTO: 68.4 %
OSMOLALITY SERPL CALC.SUM OF ELEC: 290 MOSM/KG (ref 275–295)
P AXIS: 49 DEGREES
P-R INTERVAL: 140 MS
PLATELET # BLD AUTO: 198 10(3)UL (ref 150–450)
POTASSIUM SERPL-SCNC: 4.3 MMOL/L (ref 3.5–5.1)
Q-T INTERVAL: 362 MS
QRS DURATION: 84 MS
QTC CALCULATION (BEZET): 430 MS
R AXIS: 16 DEGREES
RBC # BLD AUTO: 4.88 X10(6)UL
SODIUM SERPL-SCNC: 140 MMOL/L (ref 136–145)
T AXIS: 47 DEGREES
VENTRICULAR RATE: 85 BPM
WBC # BLD AUTO: 8.8 X10(3) UL (ref 4–11)

## 2023-02-24 PROCEDURE — 3008F BODY MASS INDEX DOCD: CPT | Performed by: FAMILY MEDICINE

## 2023-02-24 PROCEDURE — 80048 BASIC METABOLIC PNL TOTAL CA: CPT | Performed by: FAMILY MEDICINE

## 2023-02-24 PROCEDURE — 85610 PROTHROMBIN TIME: CPT | Performed by: FAMILY MEDICINE

## 2023-02-24 PROCEDURE — 85730 THROMBOPLASTIN TIME PARTIAL: CPT | Performed by: FAMILY MEDICINE

## 2023-02-24 PROCEDURE — 85025 COMPLETE CBC W/AUTO DIFF WBC: CPT | Performed by: FAMILY MEDICINE

## 2023-02-24 PROCEDURE — 3077F SYST BP >= 140 MM HG: CPT | Performed by: FAMILY MEDICINE

## 2023-02-24 PROCEDURE — 3079F DIAST BP 80-89 MM HG: CPT | Performed by: FAMILY MEDICINE

## 2023-02-24 PROCEDURE — 93000 ELECTROCARDIOGRAM COMPLETE: CPT | Performed by: FAMILY MEDICINE

## 2023-02-24 PROCEDURE — 99244 OFF/OP CNSLTJ NEW/EST MOD 40: CPT | Performed by: FAMILY MEDICINE

## 2023-02-25 LAB
APTT PPP: 25.5 SECONDS (ref 23.3–35.6)
INR BLD: 0.99 (ref 0.85–1.16)
PROTHROMBIN TIME: 13.1 SECONDS (ref 11.6–14.8)

## 2023-02-27 ENCOUNTER — LABORATORY ENCOUNTER (OUTPATIENT)
Dept: LAB | Age: 68
End: 2023-02-27
Attending: FAMILY MEDICINE
Payer: COMMERCIAL

## 2023-02-27 DIAGNOSIS — Z01.818 PRE-OP EVALUATION: ICD-10-CM

## 2023-02-27 PROCEDURE — 87081 CULTURE SCREEN ONLY: CPT | Performed by: FAMILY MEDICINE

## 2023-02-28 ENCOUNTER — ANESTHESIA EVENT (OUTPATIENT)
Dept: SURGERY | Facility: HOSPITAL | Age: 68
End: 2023-02-28
Payer: COMMERCIAL

## 2023-02-28 LAB — SARS-COV-2 RNA RESP QL NAA+PROBE: NOT DETECTED

## 2023-03-01 ENCOUNTER — APPOINTMENT (OUTPATIENT)
Dept: GENERAL RADIOLOGY | Facility: HOSPITAL | Age: 68
End: 2023-03-01
Attending: STUDENT IN AN ORGANIZED HEALTH CARE EDUCATION/TRAINING PROGRAM
Payer: COMMERCIAL

## 2023-03-01 ENCOUNTER — HOSPITAL ENCOUNTER (INPATIENT)
Facility: HOSPITAL | Age: 68
LOS: 2 days | Discharge: HOME HEALTH | End: 2023-03-03
Attending: STUDENT IN AN ORGANIZED HEALTH CARE EDUCATION/TRAINING PROGRAM | Admitting: STUDENT IN AN ORGANIZED HEALTH CARE EDUCATION/TRAINING PROGRAM
Payer: COMMERCIAL

## 2023-03-01 ENCOUNTER — ANESTHESIA (OUTPATIENT)
Dept: SURGERY | Facility: HOSPITAL | Age: 68
End: 2023-03-01
Payer: COMMERCIAL

## 2023-03-01 DIAGNOSIS — Z01.818 PRE-OP TESTING: Primary | ICD-10-CM

## 2023-03-01 DIAGNOSIS — M48.062 SPINAL STENOSIS OF LUMBAR REGION WITH NEUROGENIC CLAUDICATION: ICD-10-CM

## 2023-03-01 LAB
ANTIBODY SCREEN: NEGATIVE
RH BLOOD TYPE: POSITIVE

## 2023-03-01 PROCEDURE — 01NB0ZZ RELEASE LUMBAR NERVE, OPEN APPROACH: ICD-10-PCS | Performed by: STUDENT IN AN ORGANIZED HEALTH CARE EDUCATION/TRAINING PROGRAM

## 2023-03-01 PROCEDURE — 5A09357 ASSISTANCE WITH RESPIRATORY VENTILATION, LESS THAN 24 CONSECUTIVE HOURS, CONTINUOUS POSITIVE AIRWAY PRESSURE: ICD-10-PCS | Performed by: STUDENT IN AN ORGANIZED HEALTH CARE EDUCATION/TRAINING PROGRAM

## 2023-03-01 PROCEDURE — 0SG1071 FUSION OF 2 OR MORE LUMBAR VERTEBRAL JOINTS WITH AUTOLOGOUS TISSUE SUBSTITUTE, POSTERIOR APPROACH, POSTERIOR COLUMN, OPEN APPROACH: ICD-10-PCS | Performed by: STUDENT IN AN ORGANIZED HEALTH CARE EDUCATION/TRAINING PROGRAM

## 2023-03-01 PROCEDURE — 76000 FLUOROSCOPY <1 HR PHYS/QHP: CPT | Performed by: STUDENT IN AN ORGANIZED HEALTH CARE EDUCATION/TRAINING PROGRAM

## 2023-03-01 PROCEDURE — 99254 IP/OBS CNSLTJ NEW/EST MOD 60: CPT | Performed by: INTERNAL MEDICINE

## 2023-03-01 DEVICE — IMPLANTABLE DEVICE: Type: IMPLANTABLE DEVICE | Site: BACK | Status: FUNCTIONAL

## 2023-03-01 DEVICE — BIO DBM GEL
Type: IMPLANTABLE DEVICE | Site: BACK | Status: FUNCTIONAL
Brand: BIO DBM

## 2023-03-01 RX ORDER — VANCOMYCIN HYDROCHLORIDE
15 ONCE
Status: DISCONTINUED | OUTPATIENT
Start: 2023-03-01 | End: 2023-03-01 | Stop reason: HOSPADM

## 2023-03-01 RX ORDER — CEFAZOLIN SODIUM/WATER 2 G/20 ML
2 SYRINGE (ML) INTRAVENOUS ONCE
Status: COMPLETED | OUTPATIENT
Start: 2023-03-01 | End: 2023-03-01

## 2023-03-01 RX ORDER — OXYCODONE HYDROCHLORIDE 5 MG/1
5 TABLET ORAL EVERY 4 HOURS PRN
Status: DISCONTINUED | OUTPATIENT
Start: 2023-03-01 | End: 2023-03-03

## 2023-03-01 RX ORDER — NEOSTIGMINE METHYLSULFATE 1 MG/ML
INJECTION, SOLUTION INTRAVENOUS AS NEEDED
Status: DISCONTINUED | OUTPATIENT
Start: 2023-03-01 | End: 2023-03-01 | Stop reason: SURG

## 2023-03-01 RX ORDER — METOCLOPRAMIDE HYDROCHLORIDE 5 MG/ML
10 INJECTION INTRAMUSCULAR; INTRAVENOUS EVERY 8 HOURS PRN
Status: DISCONTINUED | OUTPATIENT
Start: 2023-03-01 | End: 2023-03-03

## 2023-03-01 RX ORDER — HYDROMORPHONE HYDROCHLORIDE 1 MG/ML
0.8 INJECTION, SOLUTION INTRAMUSCULAR; INTRAVENOUS; SUBCUTANEOUS EVERY 2 HOUR PRN
Status: DISCONTINUED | OUTPATIENT
Start: 2023-03-01 | End: 2023-03-03

## 2023-03-01 RX ORDER — MIDAZOLAM HYDROCHLORIDE 1 MG/ML
INJECTION INTRAMUSCULAR; INTRAVENOUS AS NEEDED
Status: DISCONTINUED | OUTPATIENT
Start: 2023-03-01 | End: 2023-03-01 | Stop reason: SURG

## 2023-03-01 RX ORDER — HYDROMORPHONE HYDROCHLORIDE 1 MG/ML
INJECTION, SOLUTION INTRAMUSCULAR; INTRAVENOUS; SUBCUTANEOUS AS NEEDED
Status: DISCONTINUED | OUTPATIENT
Start: 2023-03-01 | End: 2023-03-01 | Stop reason: SURG

## 2023-03-01 RX ORDER — LIDOCAINE HYDROCHLORIDE 10 MG/ML
INJECTION, SOLUTION EPIDURAL; INFILTRATION; INTRACAUDAL; PERINEURAL AS NEEDED
Status: DISCONTINUED | OUTPATIENT
Start: 2023-03-01 | End: 2023-03-01 | Stop reason: SURG

## 2023-03-01 RX ORDER — CYCLOBENZAPRINE HCL 5 MG
2.5 TABLET ORAL EVERY 6 HOURS PRN
Status: DISCONTINUED | OUTPATIENT
Start: 2023-03-01 | End: 2023-03-03

## 2023-03-01 RX ORDER — POLYETHYLENE GLYCOL 3350 17 G/17G
17 POWDER, FOR SOLUTION ORAL DAILY PRN
Status: DISCONTINUED | OUTPATIENT
Start: 2023-03-01 | End: 2023-03-03

## 2023-03-01 RX ORDER — ONDANSETRON 2 MG/ML
4 INJECTION INTRAMUSCULAR; INTRAVENOUS EVERY 6 HOURS PRN
Status: DISCONTINUED | OUTPATIENT
Start: 2023-03-01 | End: 2023-03-03

## 2023-03-01 RX ORDER — SODIUM CHLORIDE 9 MG/ML
INJECTION, SOLUTION INTRAVENOUS CONTINUOUS
Status: DISCONTINUED | OUTPATIENT
Start: 2023-03-01 | End: 2023-03-03

## 2023-03-01 RX ORDER — DIAZEPAM 2 MG/1
2 TABLET ORAL EVERY 6 HOURS PRN
Status: DISCONTINUED | OUTPATIENT
Start: 2023-03-01 | End: 2023-03-03

## 2023-03-01 RX ORDER — DIPHENHYDRAMINE HCL 25 MG
25 CAPSULE ORAL EVERY 4 HOURS PRN
Status: DISCONTINUED | OUTPATIENT
Start: 2023-03-01 | End: 2023-03-03

## 2023-03-01 RX ORDER — NALOXONE HYDROCHLORIDE 0.4 MG/ML
80 INJECTION, SOLUTION INTRAMUSCULAR; INTRAVENOUS; SUBCUTANEOUS AS NEEDED
Status: DISCONTINUED | OUTPATIENT
Start: 2023-03-01 | End: 2023-03-01 | Stop reason: HOSPADM

## 2023-03-01 RX ORDER — OXYCODONE HYDROCHLORIDE 5 MG/1
10 TABLET ORAL ONCE AS NEEDED
Status: DISCONTINUED | OUTPATIENT
Start: 2023-03-01 | End: 2023-03-01 | Stop reason: HOSPADM

## 2023-03-01 RX ORDER — ONDANSETRON 2 MG/ML
INJECTION INTRAMUSCULAR; INTRAVENOUS AS NEEDED
Status: DISCONTINUED | OUTPATIENT
Start: 2023-03-01 | End: 2023-03-01 | Stop reason: SURG

## 2023-03-01 RX ORDER — HYDROMORPHONE HYDROCHLORIDE 1 MG/ML
0.2 INJECTION, SOLUTION INTRAMUSCULAR; INTRAVENOUS; SUBCUTANEOUS EVERY 5 MIN PRN
Status: DISCONTINUED | OUTPATIENT
Start: 2023-03-01 | End: 2023-03-01 | Stop reason: HOSPADM

## 2023-03-01 RX ORDER — ONDANSETRON 2 MG/ML
4 INJECTION INTRAMUSCULAR; INTRAVENOUS EVERY 6 HOURS PRN
Status: DISCONTINUED | OUTPATIENT
Start: 2023-03-01 | End: 2023-03-01 | Stop reason: HOSPADM

## 2023-03-01 RX ORDER — HYDROMORPHONE HYDROCHLORIDE 1 MG/ML
INJECTION, SOLUTION INTRAMUSCULAR; INTRAVENOUS; SUBCUTANEOUS
Status: COMPLETED
Start: 2023-03-01 | End: 2023-03-01

## 2023-03-01 RX ORDER — PANTOPRAZOLE SODIUM 20 MG/1
20 TABLET, DELAYED RELEASE ORAL
Status: DISCONTINUED | OUTPATIENT
Start: 2023-03-02 | End: 2023-03-03

## 2023-03-01 RX ORDER — OXYCODONE HYDROCHLORIDE 5 MG/1
5 TABLET ORAL ONCE AS NEEDED
Status: DISCONTINUED | OUTPATIENT
Start: 2023-03-01 | End: 2023-03-01 | Stop reason: HOSPADM

## 2023-03-01 RX ORDER — SODIUM CHLORIDE, SODIUM LACTATE, POTASSIUM CHLORIDE, CALCIUM CHLORIDE 600; 310; 30; 20 MG/100ML; MG/100ML; MG/100ML; MG/100ML
INJECTION, SOLUTION INTRAVENOUS CONTINUOUS
Status: DISCONTINUED | OUTPATIENT
Start: 2023-03-01 | End: 2023-03-01 | Stop reason: HOSPADM

## 2023-03-01 RX ORDER — ROCURONIUM BROMIDE 10 MG/ML
INJECTION, SOLUTION INTRAVENOUS AS NEEDED
Status: DISCONTINUED | OUTPATIENT
Start: 2023-03-01 | End: 2023-03-01 | Stop reason: SURG

## 2023-03-01 RX ORDER — VANCOMYCIN HYDROCHLORIDE
15 ONCE
Status: COMPLETED | OUTPATIENT
Start: 2023-03-01 | End: 2023-03-01

## 2023-03-01 RX ORDER — DOCUSATE SODIUM 100 MG/1
100 CAPSULE, LIQUID FILLED ORAL 2 TIMES DAILY
Status: DISCONTINUED | OUTPATIENT
Start: 2023-03-01 | End: 2023-03-03

## 2023-03-01 RX ORDER — LIDOCAINE HYDROCHLORIDE 40 MG/ML
INJECTION, SOLUTION RETROBULBAR; TOPICAL AS NEEDED
Status: DISCONTINUED | OUTPATIENT
Start: 2023-03-01 | End: 2023-03-01 | Stop reason: SURG

## 2023-03-01 RX ORDER — MIDAZOLAM HYDROCHLORIDE 1 MG/ML
1 INJECTION INTRAMUSCULAR; INTRAVENOUS EVERY 5 MIN PRN
Status: DISCONTINUED | OUTPATIENT
Start: 2023-03-01 | End: 2023-03-01 | Stop reason: HOSPADM

## 2023-03-01 RX ORDER — ACETAMINOPHEN 500 MG
1000 TABLET ORAL ONCE
Status: DISCONTINUED | OUTPATIENT
Start: 2023-03-01 | End: 2023-03-01 | Stop reason: HOSPADM

## 2023-03-01 RX ORDER — ACETAMINOPHEN 10 MG/ML
INJECTION, SOLUTION INTRAVENOUS AS NEEDED
Status: DISCONTINUED | OUTPATIENT
Start: 2023-03-01 | End: 2023-03-01 | Stop reason: SURG

## 2023-03-01 RX ORDER — HYDROMORPHONE HYDROCHLORIDE 1 MG/ML
0.4 INJECTION, SOLUTION INTRAMUSCULAR; INTRAVENOUS; SUBCUTANEOUS EVERY 2 HOUR PRN
Status: DISCONTINUED | OUTPATIENT
Start: 2023-03-01 | End: 2023-03-03

## 2023-03-01 RX ORDER — DIPHENHYDRAMINE HYDROCHLORIDE 50 MG/ML
12.5 INJECTION INTRAMUSCULAR; INTRAVENOUS AS NEEDED
Status: DISCONTINUED | OUTPATIENT
Start: 2023-03-01 | End: 2023-03-01 | Stop reason: HOSPADM

## 2023-03-01 RX ORDER — DEXAMETHASONE SODIUM PHOSPHATE 4 MG/ML
VIAL (ML) INJECTION AS NEEDED
Status: DISCONTINUED | OUTPATIENT
Start: 2023-03-01 | End: 2023-03-01 | Stop reason: SURG

## 2023-03-01 RX ORDER — BUPIVACAINE HYDROCHLORIDE AND EPINEPHRINE 2.5; 5 MG/ML; UG/ML
INJECTION, SOLUTION EPIDURAL; INFILTRATION; INTRACAUDAL; PERINEURAL AS NEEDED
Status: DISCONTINUED | OUTPATIENT
Start: 2023-03-01 | End: 2023-03-01 | Stop reason: HOSPADM

## 2023-03-01 RX ORDER — ACETAMINOPHEN 500 MG
1000 TABLET ORAL EVERY 8 HOURS SCHEDULED
Status: DISCONTINUED | OUTPATIENT
Start: 2023-03-01 | End: 2023-03-03

## 2023-03-01 RX ORDER — METOCLOPRAMIDE HYDROCHLORIDE 5 MG/ML
10 INJECTION INTRAMUSCULAR; INTRAVENOUS EVERY 8 HOURS PRN
Status: DISCONTINUED | OUTPATIENT
Start: 2023-03-01 | End: 2023-03-01 | Stop reason: HOSPADM

## 2023-03-01 RX ORDER — SODIUM CHLORIDE, SODIUM LACTATE, POTASSIUM CHLORIDE, CALCIUM CHLORIDE 600; 310; 30; 20 MG/100ML; MG/100ML; MG/100ML; MG/100ML
INJECTION, SOLUTION INTRAVENOUS CONTINUOUS
Status: DISCONTINUED | OUTPATIENT
Start: 2023-03-01 | End: 2023-03-03

## 2023-03-01 RX ORDER — PHENYLEPHRINE HCL 10 MG/ML
VIAL (ML) INJECTION AS NEEDED
Status: DISCONTINUED | OUTPATIENT
Start: 2023-03-01 | End: 2023-03-01 | Stop reason: SURG

## 2023-03-01 RX ORDER — KETAMINE HYDROCHLORIDE 50 MG/ML
INJECTION, SOLUTION, CONCENTRATE INTRAMUSCULAR; INTRAVENOUS AS NEEDED
Status: DISCONTINUED | OUTPATIENT
Start: 2023-03-01 | End: 2023-03-01 | Stop reason: SURG

## 2023-03-01 RX ORDER — OXYCODONE HYDROCHLORIDE 10 MG/1
10 TABLET ORAL EVERY 4 HOURS PRN
Status: DISCONTINUED | OUTPATIENT
Start: 2023-03-01 | End: 2023-03-03

## 2023-03-01 RX ORDER — DIPHENHYDRAMINE HYDROCHLORIDE 50 MG/ML
25 INJECTION INTRAMUSCULAR; INTRAVENOUS EVERY 4 HOURS PRN
Status: DISCONTINUED | OUTPATIENT
Start: 2023-03-01 | End: 2023-03-03

## 2023-03-01 RX ORDER — MEPERIDINE HYDROCHLORIDE 25 MG/ML
12.5 INJECTION INTRAMUSCULAR; INTRAVENOUS; SUBCUTANEOUS AS NEEDED
Status: DISCONTINUED | OUTPATIENT
Start: 2023-03-01 | End: 2023-03-01 | Stop reason: HOSPADM

## 2023-03-01 RX ORDER — EPHEDRINE SULFATE 50 MG/ML
INJECTION INTRAVENOUS AS NEEDED
Status: DISCONTINUED | OUTPATIENT
Start: 2023-03-01 | End: 2023-03-01 | Stop reason: SURG

## 2023-03-01 RX ORDER — BISACODYL 10 MG
10 SUPPOSITORY, RECTAL RECTAL
Status: DISCONTINUED | OUTPATIENT
Start: 2023-03-01 | End: 2023-03-03

## 2023-03-01 RX ORDER — LABETALOL HYDROCHLORIDE 5 MG/ML
5 INJECTION, SOLUTION INTRAVENOUS EVERY 5 MIN PRN
Status: DISCONTINUED | OUTPATIENT
Start: 2023-03-01 | End: 2023-03-01 | Stop reason: HOSPADM

## 2023-03-01 RX ORDER — SENNOSIDES 8.6 MG
17.2 TABLET ORAL NIGHTLY
Status: DISCONTINUED | OUTPATIENT
Start: 2023-03-01 | End: 2023-03-03

## 2023-03-01 RX ORDER — HYDROMORPHONE HYDROCHLORIDE 1 MG/ML
0.6 INJECTION, SOLUTION INTRAMUSCULAR; INTRAVENOUS; SUBCUTANEOUS EVERY 5 MIN PRN
Status: DISCONTINUED | OUTPATIENT
Start: 2023-03-01 | End: 2023-03-01 | Stop reason: HOSPADM

## 2023-03-01 RX ORDER — CEFAZOLIN SODIUM/WATER 2 G/20 ML
2 SYRINGE (ML) INTRAVENOUS EVERY 8 HOURS
Status: COMPLETED | OUTPATIENT
Start: 2023-03-01 | End: 2023-03-02

## 2023-03-01 RX ORDER — METHOCARBAMOL 100 MG/ML
INJECTION, SOLUTION INTRAMUSCULAR; INTRAVENOUS AS NEEDED
Status: DISCONTINUED | OUTPATIENT
Start: 2023-03-01 | End: 2023-03-01 | Stop reason: SURG

## 2023-03-01 RX ORDER — SODIUM PHOSPHATE, DIBASIC AND SODIUM PHOSPHATE, MONOBASIC 7; 19 G/133ML; G/133ML
1 ENEMA RECTAL ONCE AS NEEDED
Status: DISCONTINUED | OUTPATIENT
Start: 2023-03-01 | End: 2023-03-03

## 2023-03-01 RX ORDER — HYDROMORPHONE HYDROCHLORIDE 1 MG/ML
0.4 INJECTION, SOLUTION INTRAMUSCULAR; INTRAVENOUS; SUBCUTANEOUS EVERY 5 MIN PRN
Status: DISCONTINUED | OUTPATIENT
Start: 2023-03-01 | End: 2023-03-01 | Stop reason: HOSPADM

## 2023-03-01 RX ORDER — GLYCOPYRROLATE 0.2 MG/ML
INJECTION, SOLUTION INTRAMUSCULAR; INTRAVENOUS AS NEEDED
Status: DISCONTINUED | OUTPATIENT
Start: 2023-03-01 | End: 2023-03-01 | Stop reason: SURG

## 2023-03-01 RX ADMIN — CEFAZOLIN SODIUM/WATER 2 G: 2 G/20 ML SYRINGE (ML) INTRAVENOUS at 07:44:00

## 2023-03-01 RX ADMIN — ROCURONIUM BROMIDE 10 MG: 10 INJECTION, SOLUTION INTRAVENOUS at 09:28:00

## 2023-03-01 RX ADMIN — MIDAZOLAM HYDROCHLORIDE 2 MG: 1 INJECTION INTRAMUSCULAR; INTRAVENOUS at 07:35:00

## 2023-03-01 RX ADMIN — ACETAMINOPHEN 1000 MG: 10 INJECTION, SOLUTION INTRAVENOUS at 13:02:00

## 2023-03-01 RX ADMIN — SODIUM CHLORIDE, SODIUM LACTATE, POTASSIUM CHLORIDE, CALCIUM CHLORIDE: 600; 310; 30; 20 INJECTION, SOLUTION INTRAVENOUS at 07:36:00

## 2023-03-01 RX ADMIN — SODIUM CHLORIDE, SODIUM LACTATE, POTASSIUM CHLORIDE, CALCIUM CHLORIDE: 600; 310; 30; 20 INJECTION, SOLUTION INTRAVENOUS at 13:22:00

## 2023-03-01 RX ADMIN — PHENYLEPHRINE HCL 100 MCG: 10 MG/ML VIAL (ML) INJECTION at 09:04:00

## 2023-03-01 RX ADMIN — ROCURONIUM BROMIDE 10 MG: 10 INJECTION, SOLUTION INTRAVENOUS at 11:49:00

## 2023-03-01 RX ADMIN — PHENYLEPHRINE HCL 100 MCG: 10 MG/ML VIAL (ML) INJECTION at 08:01:00

## 2023-03-01 RX ADMIN — EPHEDRINE SULFATE 10 MG: 50 INJECTION INTRAVENOUS at 08:26:00

## 2023-03-01 RX ADMIN — NEOSTIGMINE METHYLSULFATE 4.5 MG: 1 INJECTION, SOLUTION INTRAVENOUS at 13:22:00

## 2023-03-01 RX ADMIN — ROCURONIUM BROMIDE 10 MG: 10 INJECTION, SOLUTION INTRAVENOUS at 10:09:00

## 2023-03-01 RX ADMIN — ROCURONIUM BROMIDE 20 MG: 10 INJECTION, SOLUTION INTRAVENOUS at 12:22:00

## 2023-03-01 RX ADMIN — GLYCOPYRROLATE 0.6 MG: 0.2 INJECTION, SOLUTION INTRAMUSCULAR; INTRAVENOUS at 13:22:00

## 2023-03-01 RX ADMIN — SODIUM CHLORIDE, SODIUM LACTATE, POTASSIUM CHLORIDE, CALCIUM CHLORIDE: 600; 310; 30; 20 INJECTION, SOLUTION INTRAVENOUS at 08:51:00

## 2023-03-01 RX ADMIN — KETAMINE HYDROCHLORIDE 15 MG: 50 INJECTION, SOLUTION, CONCENTRATE INTRAMUSCULAR; INTRAVENOUS at 08:08:00

## 2023-03-01 RX ADMIN — ROCURONIUM BROMIDE 50 MG: 10 INJECTION, SOLUTION INTRAVENOUS at 07:40:00

## 2023-03-01 RX ADMIN — HYDROMORPHONE HYDROCHLORIDE 0.5 MG: 1 INJECTION, SOLUTION INTRAMUSCULAR; INTRAVENOUS; SUBCUTANEOUS at 13:46:00

## 2023-03-01 RX ADMIN — ROCURONIUM BROMIDE 40 MG: 10 INJECTION, SOLUTION INTRAVENOUS at 08:14:00

## 2023-03-01 RX ADMIN — LIDOCAINE HYDROCHLORIDE 50 MG: 10 INJECTION, SOLUTION EPIDURAL; INFILTRATION; INTRACAUDAL; PERINEURAL at 07:40:00

## 2023-03-01 RX ADMIN — DEXAMETHASONE SODIUM PHOSPHATE 8 MG: 4 MG/ML VIAL (ML) INJECTION at 07:50:00

## 2023-03-01 RX ADMIN — CEFAZOLIN SODIUM/WATER 2 G: 2 G/20 ML SYRINGE (ML) INTRAVENOUS at 11:44:00

## 2023-03-01 RX ADMIN — HYDROMORPHONE HYDROCHLORIDE 0.5 MG: 1 INJECTION, SOLUTION INTRAMUSCULAR; INTRAVENOUS; SUBCUTANEOUS at 13:13:00

## 2023-03-01 RX ADMIN — ROCURONIUM BROMIDE 10 MG: 10 INJECTION, SOLUTION INTRAVENOUS at 09:06:00

## 2023-03-01 RX ADMIN — ONDANSETRON 4 MG: 2 INJECTION INTRAMUSCULAR; INTRAVENOUS at 13:02:00

## 2023-03-01 RX ADMIN — ROCURONIUM BROMIDE 20 MG: 10 INJECTION, SOLUTION INTRAVENOUS at 10:50:00

## 2023-03-01 RX ADMIN — LIDOCAINE HYDROCHLORIDE 4 ML: 40 INJECTION, SOLUTION RETROBULBAR; TOPICAL at 07:41:00

## 2023-03-01 RX ADMIN — METHOCARBAMOL 1 G: 100 INJECTION, SOLUTION INTRAMUSCULAR; INTRAVENOUS at 13:13:00

## 2023-03-01 RX ADMIN — PHENYLEPHRINE HCL 100 MCG: 10 MG/ML VIAL (ML) INJECTION at 08:06:00

## 2023-03-01 RX ADMIN — PHENYLEPHRINE HCL 100 MCG: 10 MG/ML VIAL (ML) INJECTION at 08:23:00

## 2023-03-01 NOTE — BRIEF OP NOTE
Pre-Operative Diagnosis: Lumbar stenosis with neurogenic claudication [M48.062]     Post-Operative Diagnosis: Lumbar stenosis with neurogenic claudication [M48.062]      Procedure Performed:   LUMBAR 4  -5  POSTERIOR INSTRUMENTED FUSION, AND LUMBAR 1 - 2, LUMBAR 2 -3, LUMBAR 3-4, LUMBAR 4-5  LAMINECTOMY    Surgeon(s) and Role:     Galina Oconnor MD - Primary    Assistant(s):  Surgical Assistant.: Katheryn Shah     Surgical Findings: severe stenosis at L1-2, L4-5, recurrent stenosis at L2-L3     Specimen: none     Estimated Blood Loss: Blood Output: 400 mL (3/1/2023  1:10 PM)    Plan    Antibiotics: Cefazolin + Vancomycin x 24 hours  Anticoagulation: SCDs and early ambulation  Drain: Maintain for now  Activity: Out of bed with assistance  Brace: None  Medical history, allergies, and medications: In EPIC  Pain control: Routine postop  Anticipated discharge: When pain control and activity tolerance allow.  Should be 1-2 days  Consults: PT. Hospitalist  Other information: None      Tori Luciano MD  3/1/2023  1:28 PM

## 2023-03-01 NOTE — INTERVAL H&P NOTE
Pre-op Diagnosis: Lumbar stenosis with neurogenic claudication [M48.062]    The above referenced H&P was reviewed by Moo Rodriguez MD on 3/1/2023, the patient was examined and no significant changes have occurred in the patient's condition since the H&P was performed. I discussed with the patient and/or legal representative the potential benefits, risks and side effects of this procedure; the likelihood of the patient achieving goals; and potential problems that might occur during recuperation. I discussed reasonable alternatives to the procedure, including risks, benefits and side effects related to the alternatives and risks related to not receiving this procedure. We will proceed with procedure as planned.

## 2023-03-01 NOTE — PROGRESS NOTES
Patient arrived from PACU via bed. Dressing CDI. Spouse at bedside. Sorto in place. Oriented patient to phone, call light and room. Hospitalist notified of admission consult.

## 2023-03-01 NOTE — PLAN OF CARE
Pt a/ox4. Pain reported to lower back, also reports numbness and tingling to legs bilaterally. Patient reports numbness and tingling to legs prior to surgery as well. CMS otherwise intact. Started on PO and IV pain medications. Sorto catheter in place, patent.   hemovac drain to suction to back. Dressing to back CDI. Plan to get up and OOB this evening with staff.  PT/OT to evaluate in am.

## 2023-03-01 NOTE — ANESTHESIA PROCEDURE NOTES
Airway  Date/Time: 3/1/2023 7:42 AM  Urgency: elective      General Information and Staff    Patient location during procedure: OR  Anesthesiologist: Solomon Becerril MD  Resident/CRNA: Chelsey Martinez CRNA  Performed: CRNA   Performed by: Chelsey Martinez CRNA  Authorized by: Solomon Becerril MD      Indications and Patient Condition  Indications for airway management: anesthesia  Sedation level: deep  Preoxygenated: yes  Patient position: sniffing  Mask difficulty assessment: 1 - vent by mask    Final Airway Details  Final airway type: endotracheal airway      Successful airway: ETT  Cuffed: yes   Successful intubation technique: Video laryngoscopy  Endotracheal tube insertion site: oral  Blade type: Onescope.   Blade size: #4  ETT size (mm): 8.0    Cormack-Lehane Classification: grade I - full view of glottis  Placement verified by: chest auscultation and capnometry   Measured from: lips  ETT to lips (cm): 23  Number of attempts at approach: 1

## 2023-03-01 NOTE — ANESTHESIA PROCEDURE NOTES
Peripheral IV  Date/Time: 3/1/2023 1:46 PM  Inserted by: Carlyle Aguayo CRNA    Placement  Needle size: 20 G  Laterality: right  Location: hand  Local anesthetic: none  Site prep: alcohol  Attempts: 1

## 2023-03-01 NOTE — ANESTHESIA POSTPROCEDURE EVALUATION
[address] Patient Status:  Inpatient   Age/Gender 78year old male MRN [id number]   [facility] SURGERY Attending Dodie Mitchell MD   [facility] Day # 0 PCP Evi Weiss. Connie Rivera DO       Anesthesia Post-op Note    LUMBAR 4  -5  POSTERIOR INSTRUMENTED FUSION, AND LUMBAR 1 - 2, LUMBAR 2 -3, LUMBAR 3-4, LUMBAR 4-5  LAMINECTOMY    Procedure Summary     Date: 03/01/23 Room / Location: [facility] MAIN OR 15 / [address] [facility] OR    Anesthesia Start: 9854 Anesthesia Stop: 5061    Procedure: LUMBAR 4  -5  POSTERIOR INSTRUMENTED FUSION, AND LUMBAR 1 - 2, LUMBAR 2 -3, LUMBAR 3-4, LUMBAR 4-5  LAMINECTOMY (Spine Lumbar) Diagnosis:       Lumbar stenosis with neurogenic claudication      (Lumbar stenosis with neurogenic claudication [E73.196])    Surgeons: Dodie Mitchell MD Anesthesiologist: Olivia Buchanan MD    Anesthesia Type: general ASA Status: 2          Anesthesia Type: general    Vitals Value Taken Time   /70 03/01/23 1357   Temp 98.2 03/01/23 1357   Pulse 92 03/01/23 1357   Resp 18 03/01/23 1357   SpO2 94% 03/01/23 1357       Patient Location: PACU    Anesthesia Type: general    Airway Patency: patent    Postop Pain Control: adequate    Mental Status: mildly sedated but able to meaningfully participate in the post-anesthesia evaluation    Nausea/Vomiting: none    Cardiopulmonary/Hydration status: stable euvolemic    Complications: no apparent anesthesia related complications    Postop vital signs: stable    Dental Exam: Unchanged from Preop    Patient to be discharged from PACU when criteria met.

## 2023-03-02 ENCOUNTER — APPOINTMENT (OUTPATIENT)
Dept: GENERAL RADIOLOGY | Facility: HOSPITAL | Age: 68
End: 2023-03-02
Attending: STUDENT IN AN ORGANIZED HEALTH CARE EDUCATION/TRAINING PROGRAM
Payer: COMMERCIAL

## 2023-03-02 LAB
ERYTHROCYTE [DISTWIDTH] IN BLOOD BY AUTOMATED COUNT: 14.1 %
HCT VFR BLD AUTO: 36.2 %
HGB BLD-MCNC: 12 G/DL
MCH RBC QN AUTO: 31.2 PG (ref 26–34)
MCHC RBC AUTO-ENTMCNC: 33.1 G/DL (ref 31–37)
MCV RBC AUTO: 94 FL
PLATELET # BLD AUTO: 150 10(3)UL (ref 150–450)
RBC # BLD AUTO: 3.85 X10(6)UL
WBC # BLD AUTO: 12.6 X10(3) UL (ref 4–11)

## 2023-03-02 PROCEDURE — 72100 X-RAY EXAM L-S SPINE 2/3 VWS: CPT | Performed by: STUDENT IN AN ORGANIZED HEALTH CARE EDUCATION/TRAINING PROGRAM

## 2023-03-02 PROCEDURE — 99231 SBSQ HOSP IP/OBS SF/LOW 25: CPT | Performed by: INTERNAL MEDICINE

## 2023-03-02 NOTE — CM/SW NOTE
03/02/23 0800   CM/SW Referral Data   Referral Source Social Work (self-referral)   Reason for Referral Discharge planning   Informant EMR;Clinical Staff Member   Discharge Needs   Anticipated D/C needs Home health care     HOME SITUATION  Type of Home: House   Home Layout: One level  Stairs to Enter : 2  Railing: No     Lives With: Spouse  Drives: Yes  Patient Owned Equipment: Rolling walker     Prior Level of [place] per PT eval: Pt reports continues to work as a - currently mostly short distances. Pt reports prior to sx, had B LE numbness, weakness and pain. Patient is a 77 y/o man admitted s/p Lumbar fusion. Pt with pre-operative plan for [facility]. PT recommending home at DC. Referral sent to [facility] via [facility] to confirm acceptance. / to remain available for support and/or discharge planning. Caleb Montiel [facility]  Discharge Planner  [phone]    Addendum:  Received confirmation from [facility] that pt can be accepted. Met with pt and provided AIDIN information for [facility]. Pt agreeable with DC plan. No further DC needs/concerns identified at this time. SW available should additional discharge needs arise.

## 2023-03-02 NOTE — PLAN OF CARE
Patient A/O x4, VSS on RA, c/o mod-severe pain. , tele, TEDs, SCDs, AILEEN w/CPAP. Voiding freely via xiong, last BM 2/28. Hemovac in place. Plan for PT/OT eval and dc home w/[facility] when cleared. Safety measures in place.

## 2023-03-02 NOTE — DISCHARGE INSTRUCTIONS
Sometimes managing your health at home requires assistance. The [facility] team has recognized your preference to use [facility]. They can be reached by phone at [phone]. The fax number for your reference is [phone]. A representative from the home health agency will contact you or your family to schedule your first visit. Spine Surgery Postoperative Instructions    Wound / Dressing Care:    Before changing your dressing, wash your hands (or ask your helper to wash their hands for 20 seconds). Do not apply creams, solutions, or ointments to the incision. You may remove the dressing 3 days after surgery, if there is no further drainage, and shower. If there is drainage, cover the wound with new dressing and wait until drainage is no longer present. Apply sterile dressing to wound until 7 days after surgery, then you may leave wound open to air if there is no drainage. Check the incision for increased warmth, redness, swelling, unexplained increase in pain, change in the drainage, or persistent drainage that is not decreasing. Call Dr. Rosales Emanuel office [phone] if there are changes or concerns. If you have Steri strips, please leave them on until they are loose and almost falling off. In this case, you may then gently lift off the Steri strips. Showering:   Do no apply water direct over the wound. It's ok to let water run over the incision. Pat the incision dry with a clean towel and apply new dressing if less than 7 days. Do not bath, swim, or soak in water until cleared by your surgeon. Medications:  Please resume your pre-hospital medications unless otherwise instructed. If you usually take blood thinners, you will be given instructions about when to resume them. Please inform your primary care physician about your admission to the hospital and if there has been a change in your usual medications, while you were hospitalized. Managing pain:  You may have some ups and downs with your pain. You may be encouraged if you notice the gradual improvement in the pain as the days go by. You may be able to take the edge off the pain but not stop all your pain, however. Pain is part of the healing process after injury and surgery. Please follow the plan below to help control your pain and reduce the amount of narcotics you require. - Take 1000 mg of Acetaminophen (Tylenol) three times a day scheduled for first 5 days. Do not exceed more than 3,000 mg in a 24 hour period or mix with other medications that contain acetaminophen. Take Tylenol as needed if no longer requiring supplemental narcotics  - Supplement with Oxycodone 5-10mg every 4 hours as needed  - For muscle spasm type pain take muscle relaxant every 8 hours as needed    As your pain improves, please decrease the amount of pain medication (by taking fewer tablets and/or increasing the time between doses). Do not increase the dose once you have dropped down to a lesser amount. Hold your pain medication if you experience over sedation (sleeping too much), slurred speech, slow breathing, or hallucinations. If these symptoms occur, you will need to get advice on how and when it is safe to resume your pain medication. Please call for advice. Please do not drink alcohol, drive, or operate heavy machinery while taking your pain or anti spasm medication. For surgery related pain medication refills, if needed, please call the spine clinic directly [phone]  (please leave a message for call back) or your usual pain prescribing clinician. Constipation:   Pain medication often causes constipation. Try standing and moving for 1 minute each hour and work up to walking 30 minutes a day.     Drink fluids (32-64 ounces every day) unless patient has cardiac history    Minimize narcotic use    Use natural laxatives such as prune juice, but avoid coffee or caffeinated products that may disturb sleep cycle    Have high fiber diet, have smaller meals throughout the day    Take Miralax mixed with water or juice BID and Senna-S nightly until regular BM. Hold for loose, frequent, or water stools    If no BM, take Senna-s twice a day       Smoking:   Failure of fusion is as high as 65% in smokers and nicotine users. Therefore, spine patients should not smoke or use nicotine for 6 months after surgery. This is your time to quit. Activity:   Walking is encouraged. Avoid heavy lifting (no more than 10 pounds). Do bend at the waist to lift anything. Avoid extreme twisting. You may not drive a car until cleared to do so by the spine team. Please wear a seat belt. Sexual Activity:   After 2 weeks, when comfortable and approved by your surgeon. Stop if causing pain. When should you call the office: Call if  You have increased drainage and/or odor from you wound. You have increased redness/swelling at the incision site or unexplained incisional pain. You have a fever of greater than 101 degrees that lasts for several hours. Keep in mind that elevated temperature is common within the first several days after surgery. If available, take Tylenol and do deep breathing and coughing to reduce the temperature. If the fever persists after 5 days from surgery, then contact your surgeon. You have new or unfamiliar pain or weakness in your arms or legs. You have loss of control of urination or bowel movements, pain or numbness in the rectal, vaginal, or scrotal area. Constipation is very common especially when taking pain medications. If stool softeners, laxatives, and other treatments do not work, contact your PCP or surgeon. You have new tenderness in your calf, redness or discoloration of the leg, new shortness of breath, cough up blood, or have chest pain. These may be signs of a blood clot.      For life threatening emergency including difficulty breathing or chest pain, please call 911. Follow-up: If you have questions regarding your appointment or if an appointment has not been made, please speak with your surgeon's staff [phone].

## 2023-03-02 NOTE — PROGRESS NOTES
Patient up with RN and walker, ambulated approximately 150 feet in dione;l with RW. Repositioned up in chair.

## 2023-03-02 NOTE — OPERATIVE REPORT
SURGEON: Darryl Arias MD    DATE OF SURGERY: 3/1/2023    PREOPERATIVE DIAGNOSIS:   Lumbar stenosis with neurogenic claudication    POSTOPERATIVE DIAGNOSIS:   Lumbar stenosis with neurogenic claudication    NAME OF OPERATION:  1. L4-5 posterior spinal fusion (04498)  2. L4-5 posterior spinal instrumentation with K2M. (28935)  3. Posterior spinal decompression consisting of laminectomy L1-L2, L2-L3, L3-L4, L4-L5 (70860, [id number])  4. Use of local autograft. (37307)  5. Use of allograft or demineralized bone matrix. (48238)      ANESTHESIA: General endotracheal.     ESTIMATED BLOOD LOSS: 400 mL. DISPOSITION: Stable, extubated to PACU. INDICATIONS: This patient is a 78year old-year-old male with a long-standing history of lumbar stenosis with neurogenic claudication worse upon standing and walking for any distance. Patient had prior decompression surgery L2-L3 12 years ago. Imaging demonstrated multilevel spondylotic changes from L1-L5 with spondylolisthesis at L4-5 as well as severe canal stenosis at L1-L2, L4-5. There was moderate stenosis at L3-4 and recurrent stenosis at L2-3. He had failed conservative measures of activity modification, oral medications, injection-type therapy, and having subjective, objective and corroborative imaging evidence of neurogenic claudication from spinal stenosis, surgical treatment was offered. The patient understood the benefits, alternatives and risks of the procedure with risks including but not limited to risk of anesthesia, infection, nerve damage, blood vessel damage, dural tear, blindness, need for further surgery, failure to relieve symptoms, adjacent level problems. He elected to proceed after undergoing preoperative medical clearance. The risk of non union, junctional degeneration, bone graft donor morbidity, the possible need for further surgery, the risk of instrumentation failure, as well as chronic pain were also explained.     DESCRIPTION OF PROCEDURE: The patient was identified in the preoperative holding area. The site of surgery and consent verified with the patient. Patient was then brought to the Operating Room. General anesthesia was administered. The patient was intubated without difficulty. KULWANT and SCD stockings were placed for mechanical DVT prophylaxis. He was then positioned prone onto a Petr table with bony prominences well padded. Abdomen was hanging free. His back was prepped and draped in the usual sterile standard fashion. A hard stop surgical timeout performed with all members of the Operating Room staff, and IV Ancef and Vancomycin given prior to surgical start. We utilized fluoroscopy with metallic markers to bev the proposed site of the skin incision from the L1 to L5. A midline longitudinal approach was performed down through the skin with a knife, Bovie cautery down through the lumbodorsal fascia. Hemostasis was achieved. Subperiosteal dissection was carried out bilaterally to the lateral edges of the pars at L1 to L5. Significant care was taken to avoid introducing durotomy at the previous laminectomy site. Intraoperative radiographic localization was performed. Lateral radiograph was used to confirm appropriate levels. Decompression was then started, first by removal of the spinous process of L1, caudal portion of L3, and L4 with the Leksell rongeur. A Leksell rongeur used to thin down the lamina to the level of the ligamentum flavum. A small curved curette was then used to undermine the inferior insertion of the ligamentum flavum on the undersurface of the inferior lamina at L4-5. All dural adhesions were freed from the surrounding bone and ligaments with the use of a yesy prior to the use of the Kerrison punch. A 3-0 and 4-0 Kerrison punch was then used to perform a midline decompression. The ligamentum flavum was dissected off its insertions and removed with Kerrisons.  The ventral and medial portion of the facets were then removed from the lateral recess to decompress the traversing nerves. Decompression was carried out cranially in a similar manner at L3-4. At L2-3, I was able to identify the pars, and care was taken to free the dural from surrounding scar tissue. Finally, the liagmenum was removed at L1 with satisfactory decompression of the lateral recess. The L1 through L5 pedicles were palpated as were the foramen. Foraminotomies of the respective nerve roots was performed bilaterally to allow for the free passage of a [facility] into the foramen with no difficulty. There was particular foraminal stenosis at L2-3 bilaterally. The spinal canal, lateral recesses, and foramina were then palpated, showing no further nerve compression. I then prepared the pedicle screw paths at L4 and L5 utilizing anatomic landmarks. I utilized a 3 mm bur to make the starting point at the junction of the transverse process and superior articular process. The pedicles were cannulated using a gear shift probe. The pedicles were palpated showing no sign of a screw breach. Screws were placed without difficulty. AP and lateral X-rays were performed to assess placement and showed no sign of aberant screw placement. Rods were measured and contoured to the patient's anatomy. Screws were then affixed to the rods and locked with set caps using a torque limiting screw . For posterior fusion, the transverse processes of  L4 and L5 were exposed. A high speed lizbeth was used to decorticate the transverse processes, L4-5 facet joints, and remaining pars bone. Autogenous bone graft was mixed with demineralized bone matrix and packed bilaterally along the decorticated lumbar bones. The wound was then irrigated with a liter of normal saline. A medium hemovac drain was placed. The wound was then closed in a sequential fashion with 0 Vicryl, 2-0 Vicryl, and 3-0 Monocryl for the skin. A dry, sterile dressing was then applied.  He was flipped back over supine on the hospital table, extubated without difficulty, had purposeful movement of all four extremities prior to leaving the Operating Room. I attest I was present for and performed all key and critical aspects of the procedure. Modifier 22: This was a challenging procedure in the setting of prior decompression surgery creating extensive scar tissue around the dural and nerve roots. This added approximately 75% additional surgical time.

## 2023-03-03 VITALS
DIASTOLIC BLOOD PRESSURE: 67 MMHG | HEART RATE: 79 BPM | WEIGHT: 229 LBS | OXYGEN SATURATION: 93 % | SYSTOLIC BLOOD PRESSURE: 127 MMHG | TEMPERATURE: 98 F | RESPIRATION RATE: 17 BRPM | HEIGHT: 72 IN | BODY MASS INDEX: 31.02 KG/M2

## 2023-03-03 LAB
ERYTHROCYTE [DISTWIDTH] IN BLOOD BY AUTOMATED COUNT: 14.5 %
HCT VFR BLD AUTO: 35.9 %
HGB BLD-MCNC: 11.6 G/DL
MCH RBC QN AUTO: 30.8 PG (ref 26–34)
MCHC RBC AUTO-ENTMCNC: 32.3 G/DL (ref 31–37)
MCV RBC AUTO: 95.2 FL
PLATELET # BLD AUTO: 165 10(3)UL (ref 150–450)
RBC # BLD AUTO: 3.77 X10(6)UL
WBC # BLD AUTO: 11 X10(3) UL (ref 4–11)

## 2023-03-03 PROCEDURE — 99231 SBSQ HOSP IP/OBS SF/LOW 25: CPT | Performed by: INTERNAL MEDICINE

## 2023-03-03 RX ORDER — NALOXONE HYDROCHLORIDE 4 MG/.1ML
4 SPRAY NASAL AS NEEDED
Qty: 1 KIT | Refills: 0 | Status: SHIPPED | OUTPATIENT
Start: 2023-03-03

## 2023-03-03 RX ORDER — OXYCODONE HYDROCHLORIDE 10 MG/1
10 TABLET ORAL EVERY 4 HOURS PRN
Qty: 30 TABLET | Refills: 0 | Status: SHIPPED | OUTPATIENT
Start: 2023-03-03 | End: 2023-03-08

## 2023-03-03 RX ORDER — DIAZEPAM 2 MG/1
2 TABLET ORAL EVERY 6 HOURS PRN
Qty: 30 TABLET | Refills: 0 | Status: SHIPPED | OUTPATIENT
Start: 2023-03-03

## 2023-03-03 RX ORDER — SENNA AND DOCUSATE SODIUM 50; 8.6 MG/1; MG/1
1 TABLET, FILM COATED ORAL DAILY
Qty: 30 TABLET | Refills: 0 | Status: SHIPPED | OUTPATIENT
Start: 2023-03-03

## 2023-03-03 RX ORDER — CYCLOBENZAPRINE HCL 5 MG
2.5 TABLET ORAL EVERY 6 HOURS PRN
Qty: 30 TABLET | Refills: 0 | Status: SHIPPED | OUTPATIENT
Start: 2023-03-03

## 2023-03-03 RX ORDER — ACETAMINOPHEN 500 MG
1000 TABLET ORAL EVERY 8 HOURS SCHEDULED
Qty: 60 TABLET | Refills: 0 | Status: SHIPPED | OUTPATIENT
Start: 2023-03-03

## 2023-03-03 NOTE — PROGRESS NOTES
Patient chart reviewed for discharge: Medication Reconciliation completed, Specialist/PCP follow up listed, and disease specific Instructions/Education included in After Visit Summary. Discharge RN notified patient's RN of AVS completion and verified all consultants have signed off. Patient's RN to notify DC RN if discharge status changes. Will dc home w/ [facility] once meds delivered. Walker given.

## 2023-03-03 NOTE — PLAN OF CARE
Pt a/ox4. VSS. Maintains sats on room air, O2 or CPAP when sleeping. Encouraging coughing and deep breathing exercises. Dressing to back with scant drainage, hemovac to suction. 110 ml out this shift. Sensation to BLE improving, patient reporting numbness/tingling resolving. Up with minimal assist and walker. Pt to see again in am to practice stairs. Pain control much improved with valium, and oxy 10. Required 1 IVP dose of dilaudid this shift after XR.    Plan to home with [facility] on discharge, possible 3/3

## 2023-03-03 NOTE — PLAN OF CARE
Patient awaiting discharge. Drain removed, Dressing changed. Vitals stable, pain controlled. Problem: Patient/Family Goals  Goal: Patient/Family Long Term Goal  Description: Patient's Long Term Goal: would like to be able to walk without numbness or weakness to legs    Interventions:  - PT/OT to evaluate and treat  - encourage early ambulation and mobility  - maintain safety precautions  - provide adequate pain control    - See additional Care Plan goals for specific interventions  Outcome: Progressing  Goal: Patient/Family Short Term Goal  Description: Patient's Short Term Goal: discharge to home with family    Interventions:   - PT/OT to evaluate and treat  - encourage early ambulation and mobility  - maintain safety precautions  - provide adequate pain control    - See additional Care Plan goals for specific interventions  Outcome: Progressing     Problem: PAIN - ADULT  Goal: Verbalizes/displays adequate comfort level or patient's stated pain goal  Description: INTERVENTIONS:  - Encourage pt to monitor pain and request assistance  - Assess pain using appropriate pain scale  - Administer analgesics based on type and severity of pain and evaluate response  - Implement non-pharmacological measures as appropriate and evaluate response  - Consider cultural and social influences on pain and pain management  - Manage/alleviate anxiety  - Utilize distraction and/or relaxation techniques  - Monitor for opioid side effects  - Notify MD/LIP if interventions unsuccessful or patient reports new pain  - Anticipate increased pain with activity and pre-medicate as appropriate  Outcome: Progressing     Problem: SAFETY ADULT - FALL  Goal: Free from fall injury  Description: INTERVENTIONS:  - Assess pt frequently for physical needs  - Identify cognitive and physical deficits and behaviors that affect risk of falls.   - [place] fall precautions as indicated by assessment.  - Educate pt/family on patient safety including physical limitations  - Instruct pt to call for assistance with activity based on assessment  - Modify environment to reduce risk of injury  - Provide assistive devices as appropriate  - Consider OT/PT consult to assist with strengthening/mobility  - Encourage toileting schedule  Outcome: Progressing

## 2023-03-03 NOTE — PLAN OF CARE
A/o x4. RA//IS. AILEEN with CPAP at night. Tele:NSR. SCD's/ankle pumps encouraged. Regular diet denies n/v. LBM 2/28. IV SL. Up x1 assist. Hemovac drain to bulb suction. Pt states he has numbness to upper thighs but has had this happen during prior surgeries. Pt to dc home with [facility]. POC updated with pt. All safety measures in place. Call light within reach instructed pt to call for help or assistance.

## 2023-03-06 ENCOUNTER — PATIENT OUTREACH (OUTPATIENT)
Dept: CASE MANAGEMENT | Age: 68
End: 2023-03-06

## 2023-03-06 ENCOUNTER — TELEPHONE (OUTPATIENT)
Dept: ORTHOPEDICS CLINIC | Facility: CLINIC | Age: 68
End: 2023-03-06

## 2023-03-06 RX ORDER — OXYCODONE HYDROCHLORIDE 10 MG/1
10 TABLET ORAL EVERY 4 HOURS PRN
Qty: 30 TABLET | Refills: 0 | Status: SHIPPED | OUTPATIENT
Start: 2023-03-06

## 2023-03-06 NOTE — TELEPHONE ENCOUNTER
Pt requesting refill for Oxycodone to be sent to [place] on [address]  in [facility].     Updated Pharmacy on file    SX: 03.01.23  FOV: 03.17.23  LAST RX: Oxycodone 10mg ; 30tabs ; 03.03.23  LAST LABS:  PER PROTOCOL: Take 1 tablet (10mg total) by mouth every 4 (four) hours

## 2023-03-08 ENCOUNTER — OFFICE VISIT (OUTPATIENT)
Dept: FAMILY MEDICINE CLINIC | Facility: CLINIC | Age: 68
End: 2023-03-08
Payer: COMMERCIAL

## 2023-03-08 VITALS
DIASTOLIC BLOOD PRESSURE: 84 MMHG | OXYGEN SATURATION: 98 % | BODY MASS INDEX: 31.69 KG/M2 | HEART RATE: 88 BPM | HEIGHT: 72 IN | WEIGHT: 234 LBS | SYSTOLIC BLOOD PRESSURE: 138 MMHG | RESPIRATION RATE: 18 BRPM | TEMPERATURE: 98 F

## 2023-03-08 DIAGNOSIS — Z98.890 POST-OPERATIVE STATE: Primary | ICD-10-CM

## 2023-03-08 DIAGNOSIS — I10 ESSENTIAL HYPERTENSION: ICD-10-CM

## 2023-03-08 DIAGNOSIS — G47.33 OSA ON CPAP: ICD-10-CM

## 2023-03-08 DIAGNOSIS — Z91.013 SHELLFISH ALLERGY: ICD-10-CM

## 2023-03-08 DIAGNOSIS — Z99.89 OSA ON CPAP: ICD-10-CM

## 2023-03-08 PROCEDURE — 3079F DIAST BP 80-89 MM HG: CPT | Performed by: FAMILY MEDICINE

## 2023-03-08 PROCEDURE — 3008F BODY MASS INDEX DOCD: CPT | Performed by: FAMILY MEDICINE

## 2023-03-08 PROCEDURE — 3075F SYST BP GE 130 - 139MM HG: CPT | Performed by: FAMILY MEDICINE

## 2023-03-08 PROCEDURE — 1111F DSCHRG MED/CURRENT MED MERGE: CPT | Performed by: FAMILY MEDICINE

## 2023-03-08 PROCEDURE — 99214 OFFICE O/P EST MOD 30 MIN: CPT | Performed by: FAMILY MEDICINE

## 2023-03-08 RX ORDER — EPINEPHRINE 0.3 MG/.3ML
0.3 INJECTION SUBCUTANEOUS ONCE
Qty: 1 EACH | Refills: 0 | Status: SHIPPED | OUTPATIENT
Start: 2023-03-08 | End: 2023-03-08

## 2023-03-08 NOTE — PATIENT INSTRUCTIONS
I reviewed hospital records. Medication and problem list updated  I discussed safety concerns both in and out of the home as well as restricted activities. I discussed pain management strategies. I discussed the importance of staying ahead of the pain. Follow-up with Dr. Jocelyne King as scheduled  Continue monitoring blood pressure  Reviewed management strategies for shellfish allergy. Would recommend patient obtain an EpiPen. With any known exposure to shellfish, he should immediately take Benadryl and Pepcid and monitor for progressive symptoms.   Discussed appropriate administration of epinephrine

## 2023-03-09 RX ORDER — OXYCODONE HYDROCHLORIDE 5 MG/1
5 TABLET ORAL EVERY 4 HOURS PRN
Qty: 30 TABLET | Refills: 0 | Status: SHIPPED | OUTPATIENT
Start: 2023-03-09

## 2023-03-09 NOTE — TELEPHONE ENCOUNTER
Pt requesting oxycodone refill but at lower dose. Pended lower dose. DOS: 3/1/23  Last OV: 1/26/23  Last refill date: 3/6/23     #/refills: 30/0    Future Appointments   Date Time Provider Les Anderson   3/17/2023  9:00 AM Nino Madsen MD [facility]           oxyCODONE HCl     Dispensed Written Strength Quantity Refills Days Supply Provider Pharmacy   oxycodone 10 mg tablet 03/03/2023 03/03/2023  30 each  5 Nino Madsen MD [address] [facility]. .. Does not appear pt picked up 3/6/23 order but could not reach pharmacy to confirm. Per med list, pt reported on 3/8/23 not taking the 10mg tablets.

## 2023-03-13 ENCOUNTER — TELEPHONE (OUTPATIENT)
Dept: ORTHOPEDICS CLINIC | Facility: CLINIC | Age: 68
End: 2023-03-13

## 2023-03-17 ENCOUNTER — ORDER TRANSCRIPTION (OUTPATIENT)
Dept: PHYSICAL THERAPY | Facility: HOSPITAL | Age: 68
End: 2023-03-17

## 2023-03-17 ENCOUNTER — OFFICE VISIT (OUTPATIENT)
Dept: ORTHOPEDICS CLINIC | Facility: CLINIC | Age: 68
End: 2023-03-17
Payer: COMMERCIAL

## 2023-03-17 DIAGNOSIS — Z98.1 STATUS POST LUMBAR SPINAL FUSION: Primary | ICD-10-CM

## 2023-03-17 PROCEDURE — 1111F DSCHRG MED/CURRENT MED MERGE: CPT | Performed by: STUDENT IN AN ORGANIZED HEALTH CARE EDUCATION/TRAINING PROGRAM

## 2023-03-17 PROCEDURE — 99024 POSTOP FOLLOW-UP VISIT: CPT | Performed by: STUDENT IN AN ORGANIZED HEALTH CARE EDUCATION/TRAINING PROGRAM

## 2023-03-17 NOTE — PROGRESS NOTES
2 weeks status post lumbar decompression and fusion    3/1/23: L4-5 PSIF, L1-5 revision decompression    Patient is now 2 weeks out from lumbar decompression and fusion and doing well. Patient noted improvement in preoperative leg pain and paresthesias. This has been sustained. Patient is mobilizing well without any ambulatory aids. He is no longer taking any narcotic pain medication. Denies any issues with the wound or constitutional symptoms. Physical examination    Lumbar spine demonstrates a healing surgical incision. No drainage. No signs of infection or wound complication. Bilateral lower extremity exam demonstrates 5 out of 5 strength in the quadriceps, tibialis anterior, EHL, and gastrocsoleus bilaterally. Sensation is intact to light touch from L2-S1. Negative nerve tension signs    Imaging    N/A    Assessment and Plan    2 weeks status post lumbar decompression and fusion, doing well. Improvement in preoperative radiculitis and radiculopathy. Wound is healing well with no signs of complication. No narcotic pain medication requirement.     -Initiate outpatient lumbar physiotherapy  -Follow-up in 4 weeks for repeat evaluation and XR (AP/Lat)    Elaine Moore MD  Orthopedic Spine Surgeon  [facility]   [address], [address] [facility], [place], [address] [facility]   [address] LAWSON, [address]   [facility]. [email or web address]  t: [id number]   f: [phone]        This note was dictated using Dragon software. While it was briefly proofread prior to completion, some grammatical, spelling, and word choice errors due to dictation may still occur.

## 2023-03-21 ENCOUNTER — TELEPHONE (OUTPATIENT)
Dept: PHYSICAL THERAPY | Facility: HOSPITAL | Age: 68
End: 2023-03-21

## 2023-03-21 ENCOUNTER — OFFICE VISIT (OUTPATIENT)
Dept: PHYSICAL THERAPY | Age: 68
End: 2023-03-21
Attending: STUDENT IN AN ORGANIZED HEALTH CARE EDUCATION/TRAINING PROGRAM
Payer: COMMERCIAL

## 2023-03-21 DIAGNOSIS — Z98.1 STATUS POST LUMBAR SPINAL FUSION: ICD-10-CM

## 2023-03-21 PROCEDURE — 97110 THERAPEUTIC EXERCISES: CPT

## 2023-03-21 PROCEDURE — 97162 PT EVAL MOD COMPLEX 30 MIN: CPT

## 2023-03-23 ENCOUNTER — OFFICE VISIT (OUTPATIENT)
Dept: PHYSICAL THERAPY | Age: 68
End: 2023-03-23
Attending: STUDENT IN AN ORGANIZED HEALTH CARE EDUCATION/TRAINING PROGRAM
Payer: COMMERCIAL

## 2023-03-23 PROCEDURE — 97110 THERAPEUTIC EXERCISES: CPT

## 2023-03-23 PROCEDURE — 97112 NEUROMUSCULAR REEDUCATION: CPT

## 2023-03-28 ENCOUNTER — OFFICE VISIT (OUTPATIENT)
Dept: PHYSICAL THERAPY | Age: 68
End: 2023-03-28
Attending: STUDENT IN AN ORGANIZED HEALTH CARE EDUCATION/TRAINING PROGRAM
Payer: COMMERCIAL

## 2023-03-28 PROCEDURE — 97112 NEUROMUSCULAR REEDUCATION: CPT

## 2023-03-28 PROCEDURE — 97110 THERAPEUTIC EXERCISES: CPT

## 2023-03-31 ENCOUNTER — OFFICE VISIT (OUTPATIENT)
Dept: PHYSICAL THERAPY | Age: 68
End: 2023-03-31
Attending: STUDENT IN AN ORGANIZED HEALTH CARE EDUCATION/TRAINING PROGRAM
Payer: COMMERCIAL

## 2023-03-31 PROCEDURE — 97112 NEUROMUSCULAR REEDUCATION: CPT

## 2023-03-31 PROCEDURE — 97110 THERAPEUTIC EXERCISES: CPT

## 2023-04-01 ENCOUNTER — MED REC SCAN ONLY (OUTPATIENT)
Dept: ORTHOPEDICS CLINIC | Facility: CLINIC | Age: 68
End: 2023-04-01

## 2023-04-04 ENCOUNTER — OFFICE VISIT (OUTPATIENT)
Dept: PHYSICAL THERAPY | Age: 68
End: 2023-04-04
Attending: STUDENT IN AN ORGANIZED HEALTH CARE EDUCATION/TRAINING PROGRAM
Payer: COMMERCIAL

## 2023-04-04 PROCEDURE — 97112 NEUROMUSCULAR REEDUCATION: CPT

## 2023-04-04 PROCEDURE — 97110 THERAPEUTIC EXERCISES: CPT

## 2023-04-06 ENCOUNTER — OFFICE VISIT (OUTPATIENT)
Dept: PHYSICAL THERAPY | Age: 68
End: 2023-04-06
Attending: STUDENT IN AN ORGANIZED HEALTH CARE EDUCATION/TRAINING PROGRAM
Payer: COMMERCIAL

## 2023-04-06 PROCEDURE — 97112 NEUROMUSCULAR REEDUCATION: CPT

## 2023-04-06 PROCEDURE — 97110 THERAPEUTIC EXERCISES: CPT

## 2023-04-10 ENCOUNTER — OFFICE VISIT (OUTPATIENT)
Dept: PHYSICAL THERAPY | Age: 68
End: 2023-04-10
Attending: STUDENT IN AN ORGANIZED HEALTH CARE EDUCATION/TRAINING PROGRAM
Payer: COMMERCIAL

## 2023-04-10 PROCEDURE — 97110 THERAPEUTIC EXERCISES: CPT

## 2023-04-10 PROCEDURE — 97112 NEUROMUSCULAR REEDUCATION: CPT

## 2023-04-13 ENCOUNTER — OFFICE VISIT (OUTPATIENT)
Dept: PHYSICAL THERAPY | Age: 68
End: 2023-04-13
Attending: STUDENT IN AN ORGANIZED HEALTH CARE EDUCATION/TRAINING PROGRAM
Payer: COMMERCIAL

## 2023-04-13 PROCEDURE — 97112 NEUROMUSCULAR REEDUCATION: CPT

## 2023-04-13 PROCEDURE — 97110 THERAPEUTIC EXERCISES: CPT

## 2023-04-14 ENCOUNTER — HOSPITAL ENCOUNTER (OUTPATIENT)
Dept: GENERAL RADIOLOGY | Age: 68
Discharge: HOME | End: 2023-04-14
Attending: STUDENT IN AN ORGANIZED HEALTH CARE EDUCATION/TRAINING PROGRAM
Payer: COMMERCIAL

## 2023-04-14 ENCOUNTER — OFFICE VISIT (OUTPATIENT)
Dept: ORTHOPEDICS CLINIC | Facility: CLINIC | Age: 68
End: 2023-04-14
Payer: COMMERCIAL

## 2023-04-14 DIAGNOSIS — Z98.1 STATUS POST LUMBAR SPINAL FUSION: ICD-10-CM

## 2023-04-14 DIAGNOSIS — Z98.1 STATUS POST LUMBAR SPINAL FUSION: Primary | ICD-10-CM

## 2023-04-14 PROCEDURE — 99024 POSTOP FOLLOW-UP VISIT: CPT | Performed by: STUDENT IN AN ORGANIZED HEALTH CARE EDUCATION/TRAINING PROGRAM

## 2023-04-14 PROCEDURE — 72100 X-RAY EXAM L-S SPINE 2/3 VWS: CPT | Performed by: STUDENT IN AN ORGANIZED HEALTH CARE EDUCATION/TRAINING PROGRAM

## 2023-04-14 NOTE — PROGRESS NOTES
6 weeks status post lumbar fusion and decompression     3/1/23: L4-5 PSIF, L1-5 revision decompression    Patient is now 6 weeks out from lumbar fusion decompression and doing well. Improvement in preoperative leg pain and paresthesias is sustained. Patient is mobilizing well without any ambulatory aids. Started PT, and has now especially returned to prior functional level. Sofia Chapal to return to work. No complaints. Physical examination    Lumbar spine demonstrates a healed incision. No signs of infection or wound complication. Bilateral lower extremity exam demonstrates 5 out of 5 strength in the quadriceps, tibialis anterior, EHL, and gastrocsoleus bilaterally. Sensation is intact to light touch from L2-S1. Negative nerve tension signs    Imaging    XR of the lumbar spine demonstrates intact hardware. No evidence of hardware complications. Assessment and Plan    6 weeks status post fusion and decompression doing well. Preoperative radiculitis and radiculopathy resolved. Wound is healed. No pain or complaints today.  -Continue outpatient lumbar physiotherapy as needed  -Follow-up in 6 weeks for repeat clinical evaluation and repeat XR (AP/Flex/Ext)    Randy Brannon MD  Orthopedic Spine Surgeon  [facility]   [address], [address] [facility], [facility], [address] [place], [place] [address] , [address]. Christal [email or web address]  t: [id number]   f: [phone]        This note was dictated using Dragon software. While it was briefly proofread prior to completion, some grammatical, spelling, and word choice errors due to dictation may still occur.

## 2023-04-17 ENCOUNTER — TELEPHONE (OUTPATIENT)
Dept: PHYSICAL THERAPY | Facility: HOSPITAL | Age: 68
End: 2023-04-17

## 2023-04-17 ENCOUNTER — APPOINTMENT (OUTPATIENT)
Dept: PHYSICAL THERAPY | Age: 68
End: 2023-04-17
Attending: STUDENT IN AN ORGANIZED HEALTH CARE EDUCATION/TRAINING PROGRAM
Payer: COMMERCIAL

## 2023-04-19 ENCOUNTER — TELEPHONE (OUTPATIENT)
Dept: FAMILY MEDICINE CLINIC | Facility: CLINIC | Age: 68
End: 2023-04-19

## 2023-04-19 DIAGNOSIS — Z01.10 ENCOUNTER FOR AUDIOLOGY EVALUATION: Primary | ICD-10-CM

## 2023-04-19 RX ORDER — LISINOPRIL 30 MG/1
TABLET ORAL
Qty: 90 TABLET | Refills: 0 | Status: SHIPPED | OUTPATIENT
Start: 2023-04-19

## 2023-04-19 NOTE — TELEPHONE ENCOUNTER
LISINOPRIL 30 MG Oral Tab    LOV  3-8-23    LAST LAB  2-24-23  BMP  Creatinine  0.85    LAST RX  1-23-23  #90    Next OV    Future Appointments   Date Time Provider Les Monica   5/25/2023  2:00 PM Nino Madsen MD [facility]         PROTOCOL  Hypertension Medications Protocol Passed 04/19/2023 07:48 AM   Protocol Details  CMP or BMP in past 12 months    Last serum creatinine< 2.0    Appointment in past 6 or next 3 months

## 2023-04-19 NOTE — TELEPHONE ENCOUNTER
See below. Okay for routine hearing screen @ [facility]?     If so, please sign and SEND MESSAGE BACK TO ME

## 2023-04-19 NOTE — TELEPHONE ENCOUNTER
Spoke with pt re: below. He states he spoke with his insurance and they sent him a list of audiologists he can see.     Referral faxed to # below

## 2023-04-20 ENCOUNTER — APPOINTMENT (OUTPATIENT)
Dept: PHYSICAL THERAPY | Age: 68
End: 2023-04-20
Attending: STUDENT IN AN ORGANIZED HEALTH CARE EDUCATION/TRAINING PROGRAM
Payer: COMMERCIAL

## 2023-04-24 ENCOUNTER — APPOINTMENT (OUTPATIENT)
Dept: PHYSICAL THERAPY | Age: 68
End: 2023-04-24
Attending: STUDENT IN AN ORGANIZED HEALTH CARE EDUCATION/TRAINING PROGRAM
Payer: COMMERCIAL

## 2023-04-27 ENCOUNTER — APPOINTMENT (OUTPATIENT)
Dept: PHYSICAL THERAPY | Age: 68
End: 2023-04-27
Attending: STUDENT IN AN ORGANIZED HEALTH CARE EDUCATION/TRAINING PROGRAM
Payer: COMMERCIAL

## 2023-05-01 ENCOUNTER — APPOINTMENT (OUTPATIENT)
Dept: PHYSICAL THERAPY | Age: 68
End: 2023-05-01
Attending: STUDENT IN AN ORGANIZED HEALTH CARE EDUCATION/TRAINING PROGRAM
Payer: COMMERCIAL

## 2023-05-04 ENCOUNTER — APPOINTMENT (OUTPATIENT)
Dept: PHYSICAL THERAPY | Age: 68
End: 2023-05-04
Attending: STUDENT IN AN ORGANIZED HEALTH CARE EDUCATION/TRAINING PROGRAM
Payer: COMMERCIAL

## 2023-05-08 RX ORDER — CYCLOBENZAPRINE HCL 5 MG
2.5 TABLET ORAL EVERY 6 HOURS PRN
Qty: 30 TABLET | Refills: 0 | Status: SHIPPED | OUTPATIENT
Start: 2023-05-08

## 2023-05-08 NOTE — TELEPHONE ENCOUNTER
DOS: 03/01/23  Last OV: 04/14/23  Last refill date: 03/03/23     #/refills: 30/0  Upcoming appt:    Future Appointments   Date Time Provider Les Anderson   5/25/2023  2:00 PM Rusty Coburn MD [facility]

## 2023-05-25 ENCOUNTER — TELEPHONE (OUTPATIENT)
Dept: ORTHOPEDICS CLINIC | Facility: CLINIC | Age: 68
End: 2023-05-25

## 2023-05-25 ENCOUNTER — OFFICE VISIT (OUTPATIENT)
Dept: ORTHOPEDICS CLINIC | Facility: CLINIC | Age: 68
End: 2023-05-25
Payer: COMMERCIAL

## 2023-05-25 ENCOUNTER — HOSPITAL ENCOUNTER (OUTPATIENT)
Dept: GENERAL RADIOLOGY | Age: 68
Discharge: HOME | End: 2023-05-25
Attending: STUDENT IN AN ORGANIZED HEALTH CARE EDUCATION/TRAINING PROGRAM
Payer: COMMERCIAL

## 2023-05-25 DIAGNOSIS — Z98.1 STATUS POST LUMBAR SPINAL FUSION: ICD-10-CM

## 2023-05-25 DIAGNOSIS — Z98.1 STATUS POST LUMBAR SPINAL FUSION: Primary | ICD-10-CM

## 2023-05-25 PROCEDURE — 99024 POSTOP FOLLOW-UP VISIT: CPT | Performed by: STUDENT IN AN ORGANIZED HEALTH CARE EDUCATION/TRAINING PROGRAM

## 2023-05-25 PROCEDURE — 72100 X-RAY EXAM L-S SPINE 2/3 VWS: CPT | Performed by: STUDENT IN AN ORGANIZED HEALTH CARE EDUCATION/TRAINING PROGRAM

## 2023-07-17 RX ORDER — LISINOPRIL 30 MG/1
TABLET ORAL
Qty: 90 TABLET | Refills: 0 | Status: SHIPPED | OUTPATIENT
Start: 2023-07-17

## 2023-07-17 NOTE — TELEPHONE ENCOUNTER
Lisinopril 30 MG oral tab      Hypertension Medications Protocol [id number]/17/2023 12:09 PM   Protocol Details CMP or BMP in past 12 months    Last serum creatinine< 2.0    Appointment in past 6 or next 3 months      Last office visit:  7/30/22  Future Appointments   Date Time Provider Les Anderson   8/25/2023 10:20 AM Isi Peraza MD [facility]   Last filled:  4/19/23  #90 with 0 refills   Last labs:  2/24/23  Crea:  0.85

## 2023-08-24 ENCOUNTER — TELEPHONE (OUTPATIENT)
Dept: ORTHOPEDICS CLINIC | Facility: CLINIC | Age: 68
End: 2023-08-24

## 2023-08-24 DIAGNOSIS — M48.062 LUMBAR STENOSIS WITH NEUROGENIC CLAUDICATION: ICD-10-CM

## 2023-08-24 DIAGNOSIS — Z98.1 STATUS POST LUMBAR SPINAL FUSION: Primary | ICD-10-CM

## 2023-08-25 ENCOUNTER — OFFICE VISIT (OUTPATIENT)
Dept: ORTHOPEDICS CLINIC | Facility: CLINIC | Age: 68
End: 2023-08-25
Payer: COMMERCIAL

## 2023-08-25 ENCOUNTER — HOSPITAL ENCOUNTER (OUTPATIENT)
Dept: GENERAL RADIOLOGY | Age: 68
Discharge: HOME | End: 2023-08-25
Attending: STUDENT IN AN ORGANIZED HEALTH CARE EDUCATION/TRAINING PROGRAM
Payer: MEDICARE

## 2023-08-25 VITALS — WEIGHT: 234 LBS | HEIGHT: 72 IN | BODY MASS INDEX: 31.69 KG/M2

## 2023-08-25 DIAGNOSIS — M48.062 LUMBAR STENOSIS WITH NEUROGENIC CLAUDICATION: ICD-10-CM

## 2023-08-25 DIAGNOSIS — Z98.1 STATUS POST LUMBAR SPINAL FUSION: Primary | ICD-10-CM

## 2023-08-25 DIAGNOSIS — Z98.1 STATUS POST LUMBAR SPINAL FUSION: ICD-10-CM

## 2023-08-25 PROCEDURE — 3008F BODY MASS INDEX DOCD: CPT | Performed by: STUDENT IN AN ORGANIZED HEALTH CARE EDUCATION/TRAINING PROGRAM

## 2023-08-25 PROCEDURE — 99212 OFFICE O/P EST SF 10 MIN: CPT | Performed by: STUDENT IN AN ORGANIZED HEALTH CARE EDUCATION/TRAINING PROGRAM

## 2023-08-25 PROCEDURE — 72114 X-RAY EXAM L-S SPINE BENDING: CPT | Performed by: STUDENT IN AN ORGANIZED HEALTH CARE EDUCATION/TRAINING PROGRAM

## 2023-10-11 ENCOUNTER — OFFICE VISIT (OUTPATIENT)
Dept: FAMILY MEDICINE CLINIC | Facility: CLINIC | Age: 68
End: 2023-10-11
Payer: MEDICARE

## 2023-10-11 ENCOUNTER — LABORATORY ENCOUNTER (OUTPATIENT)
Dept: LAB | Age: 68
End: 2023-10-11
Attending: FAMILY MEDICINE
Payer: MEDICARE

## 2023-10-11 VITALS
DIASTOLIC BLOOD PRESSURE: 86 MMHG | TEMPERATURE: 98 F | HEART RATE: 77 BPM | RESPIRATION RATE: 16 BRPM | SYSTOLIC BLOOD PRESSURE: 124 MMHG | OXYGEN SATURATION: 96 % | HEIGHT: 72 IN | WEIGHT: 232 LBS | BODY MASS INDEX: 31.42 KG/M2

## 2023-10-11 DIAGNOSIS — Z11.59 NEED FOR HEPATITIS C SCREENING TEST: ICD-10-CM

## 2023-10-11 DIAGNOSIS — R07.89 CHEST PRESSURE: ICD-10-CM

## 2023-10-11 DIAGNOSIS — Z12.5 SCREENING FOR PROSTATE CANCER: ICD-10-CM

## 2023-10-11 DIAGNOSIS — G47.33 OSA ON CPAP: ICD-10-CM

## 2023-10-11 DIAGNOSIS — Z00.00 ENCOUNTER FOR ANNUAL HEALTH EXAMINATION: Primary | ICD-10-CM

## 2023-10-11 DIAGNOSIS — Z96.652 STATUS POST LEFT KNEE REPLACEMENT: ICD-10-CM

## 2023-10-11 DIAGNOSIS — Z13.220 ENCOUNTER FOR LIPID SCREENING FOR CARDIOVASCULAR DISEASE: ICD-10-CM

## 2023-10-11 DIAGNOSIS — Z23 NEED FOR VACCINATION: ICD-10-CM

## 2023-10-11 DIAGNOSIS — I10 ESSENTIAL HYPERTENSION: ICD-10-CM

## 2023-10-11 DIAGNOSIS — R06.02 EXERTIONAL SHORTNESS OF BREATH: ICD-10-CM

## 2023-10-11 DIAGNOSIS — G89.29 CHRONIC BILATERAL LOW BACK PAIN WITHOUT SCIATICA: ICD-10-CM

## 2023-10-11 DIAGNOSIS — Z13.0 SCREENING, ANEMIA, DEFICIENCY, IRON: ICD-10-CM

## 2023-10-11 DIAGNOSIS — N52.9 VASCULOGENIC ERECTILE DYSFUNCTION, UNSPECIFIED VASCULOGENIC ERECTILE DYSFUNCTION TYPE: ICD-10-CM

## 2023-10-11 DIAGNOSIS — R94.31 ABNORMAL EKG: ICD-10-CM

## 2023-10-11 DIAGNOSIS — K21.9 GASTROESOPHAGEAL REFLUX DISEASE WITHOUT ESOPHAGITIS: ICD-10-CM

## 2023-10-11 DIAGNOSIS — Z13.6 ENCOUNTER FOR LIPID SCREENING FOR CARDIOVASCULAR DISEASE: ICD-10-CM

## 2023-10-11 DIAGNOSIS — M54.50 CHRONIC BILATERAL LOW BACK PAIN WITHOUT SCIATICA: ICD-10-CM

## 2023-10-11 DIAGNOSIS — Z96.642 S/P HIP REPLACEMENT, LEFT: ICD-10-CM

## 2023-10-11 LAB
ATRIAL RATE: 70 BPM
BASOPHILS # BLD AUTO: 0.03 X10(3) UL (ref 0–0.2)
BASOPHILS NFR BLD AUTO: 0.4 %
CHOLEST SERPL-MCNC: 178 MG/DL (ref ?–200)
COMPLEXED PSA SERPL-MCNC: 1.2 NG/ML (ref ?–4)
EOSINOPHIL # BLD AUTO: 0.31 X10(3) UL (ref 0–0.7)
EOSINOPHIL NFR BLD AUTO: 4.1 %
ERYTHROCYTE [DISTWIDTH] IN BLOOD BY AUTOMATED COUNT: 14 %
FASTING PATIENT LIPID ANSWER: YES
HCT VFR BLD AUTO: 47.7 %
HCV AB SERPL QL IA: NONREACTIVE
HDLC SERPL-MCNC: 60 MG/DL (ref 40–59)
HGB BLD-MCNC: 16 G/DL
IMM GRANULOCYTES # BLD AUTO: 0.03 X10(3) UL (ref 0–1)
IMM GRANULOCYTES NFR BLD: 0.4 %
LDLC SERPL CALC-MCNC: 103 MG/DL (ref ?–100)
LYMPHOCYTES # BLD AUTO: 1.74 X10(3) UL (ref 1–4)
LYMPHOCYTES NFR BLD AUTO: 23.3 %
MCH RBC QN AUTO: 30.9 PG (ref 26–34)
MCHC RBC AUTO-ENTMCNC: 33.5 G/DL (ref 31–37)
MCV RBC AUTO: 92.1 FL
MONOCYTES # BLD AUTO: 0.96 X10(3) UL (ref 0.1–1)
MONOCYTES NFR BLD AUTO: 12.8 %
NEUTROPHILS # BLD AUTO: 4.41 X10 (3) UL (ref 1.5–7.7)
NEUTROPHILS # BLD AUTO: 4.41 X10(3) UL (ref 1.5–7.7)
NEUTROPHILS NFR BLD AUTO: 59 %
NONHDLC SERPL-MCNC: 118 MG/DL (ref ?–130)
P AXIS: 55 DEGREES
P-R INTERVAL: 148 MS
PLATELET # BLD AUTO: 198 10(3)UL (ref 150–450)
Q-T INTERVAL: 424 MS
QRS DURATION: 76 MS
QTC CALCULATION (BEZET): 457 MS
R AXIS: 3 DEGREES
RBC # BLD AUTO: 5.18 X10(6)UL
T AXIS: 71 DEGREES
TRIGL SERPL-MCNC: 79 MG/DL (ref 30–149)
VENTRICULAR RATE: 70 BPM
VLDLC SERPL CALC-MCNC: 13 MG/DL (ref 0–30)
WBC # BLD AUTO: 7.5 X10(3) UL (ref 4–11)

## 2023-10-11 PROCEDURE — 99214 OFFICE O/P EST MOD 30 MIN: CPT | Performed by: FAMILY MEDICINE

## 2023-10-11 PROCEDURE — 86803 HEPATITIS C AB TEST: CPT

## 2023-10-11 PROCEDURE — 90662 IIV NO PRSV INCREASED AG IM: CPT | Performed by: FAMILY MEDICINE

## 2023-10-11 PROCEDURE — 93000 ELECTROCARDIOGRAM COMPLETE: CPT | Performed by: FAMILY MEDICINE

## 2023-10-11 PROCEDURE — G0438 PPPS, INITIAL VISIT: HCPCS | Performed by: FAMILY MEDICINE

## 2023-10-11 PROCEDURE — G0008 ADMIN INFLUENZA VIRUS VAC: HCPCS | Performed by: FAMILY MEDICINE

## 2023-10-11 PROCEDURE — 36415 COLL VENOUS BLD VENIPUNCTURE: CPT

## 2023-10-11 PROCEDURE — 85025 COMPLETE CBC W/AUTO DIFF WBC: CPT

## 2023-10-11 PROCEDURE — 80061 LIPID PANEL: CPT

## 2023-10-11 RX ORDER — LISINOPRIL 30 MG/1
30 TABLET ORAL DAILY
Qty: 90 TABLET | Refills: 3 | Status: SHIPPED | OUTPATIENT
Start: 2023-10-11

## 2023-10-11 NOTE — PATIENT INSTRUCTIONS
Tyler Tineo's SCREENING SCHEDULE   Tests on this list are recommended by your physician but may not be covered, or covered at this frequency, by your insurer. Please check with your insurance carrier before scheduling to verify coverage.    PREVENTATIVE SERVICES FREQUENCY &  COVERAGE DETAILS LAST COMPLETION DATE   Diabetes Screening    Fasting Blood Sugar / Glucose    One screening every 12 months if never tested or if previously tested but not diagnosed with pre-diabetes   One screening every 6 months if diagnosed with pre-diabetes Lab Results   Component Value Date    GLU 93 02/24/2023        Cardiovascular Disease Screening    Lipid Panel  Cholesterol  Lipoprotein (HDL)  Triglycerides Covered every 5 years for all Medicare beneficiaries without apparent signs or symptoms of cardiovascular disease Lab Results   Component Value Date    CHOLEST 183 07/30/2022    HDL 53 07/30/2022     (H) 07/30/2022    TRIG 63 07/30/2022         Electrocardiogram (EKG)   Covered if needed at Welcome to Medicare, and non-screening if indicated for medical reasons 02/24/2023      Ultrasound Screening for Abdominal Aortic Aneurysm (AAA) Covered once in a lifetime for one of the following risk factors    Men who are 73-68 years old and have ever smoked    Anyone with a family history -     Colorectal Cancer Screening  Covered for ages 52-80; only need ONE of the following:    Colonoscopy   Covered every 10 years    Covered every 2 years if patient is at high risk or previous colonoscopy was abnormal 04/09/2018    Colorectal Cancer Screening due on 04/09/2028    Flexible Sigmoidoscopy   Covered every 4 years -    Fecal Occult Blood Test Covered annually -   Prostate Cancer Screening    Prostate-Specific Antigen (PSA) Annually No results found for: \"PSA\"  PSA due on 07/30/2024   Immunizations    Influenza Covered once per flu season  Please get every year 10/26/2022  Influenza Vaccine(1) due on 10/01/2023    Pneumococcal Each vaccine ([id number] & [id number]) covered once after 65 Prevnar 13: -    Ifujtrklo30: -     No recommendations at this time    Hepatitis B One screening covered for patients with certain risk factors   -  No recommendations at this time    Tetanus Toxoid Not covered by Medicare Part B unless medically necessary (cut with metal); may be covered with your pharmacy prescription benefits -    Tetanus, Diptheria and Pertusis TD and TDaP Not covered by Medicare Part B -  No recommendations at this time    Zoster Not covered by Medicare Part B; may be covered with your pharmacy  prescription benefits -  No recommendations at this time     Annual Monitoring of Persistent Medications (ACE/ARB, digoxin diuretics, anticonvulsants)    Potassium Annually Lab Results   Component Value Date    K 4.3 02/24/2023         Creatinine   Annually Lab Results   Component Value Date    CREATSERUM 0.85 02/24/2023         BUN Annually Lab Results   Component Value Date    BUN 14 02/24/2023       Drug Serum Conc Annually No results found for: \"DIGOXIN\", \"DIG\", \"VALP\"       1. Encounter for annual health examination  I reviewed age-appropriate preventive screening exams as well as advanced directives and immunizations. High-dose flu vaccine provided    2. Exertional shortness of breath  I discussed possible causes. We will rule out metabolic causes. I discussed case with Dr. Lonnie Pate. Appointment facilitated for 10/13/23  I advised patient to avoid any strenuous activity and to start a baby aspirin 81 mg today. Discussed indications for ER evaluation in the interim. - EKG with interpretation and Report -IN OFFICE [[id number]]  - CBC W Differential W Platelet [E]; Future  - [facility] CARDIOLOGY EXTERNAL    3. Abnormal EKG  Reviewed EKG, see #2  - [facility] CARDIOLOGY EXTERNAL    4.  Chest pressure  See #2, will need to rule out cardiac etiology  - EKG with interpretation and Report -IN OFFICE [[id number]]  - [facility] CARDIOLOGY EXTERNAL    5. Essential hypertension  I reviewed goals for blood pressure as well as conservative management of hypertension including sodium restriction, daily aerobic activity, alcohol moderation, smoking cessation, and maintaining ideal body weight. Continue current meds and monitoring    6. AILEEN on CPAP  Discussed importance of nightly CPAP as well as cardiovascular complications of untreated disease    7. Gastroesophageal reflux disease without esophagitis  Anti-reflux measures reviewed. Avoid large meals. Allow at least 3 hrs between eating and laying down to facilitate gastric emptying. Limit caffeine to 2 servings per day. Avoid spicy or acidic foods and liquids. Moderation of alcohol. Avoid nsaids and aspirin. May use Tylenol prn pain  Continue current meds    8. S/P hip replacement, left  Monitor clinically    9. Status post left knee replacement  Monitor clinically    10. Chronic bilateral low back pain without sciatica  Monitor clinically, activity as tolerated pending resolution of dyspnea    11. Vasculogenic erectile dysfunction, unspecified vasculogenic erectile dysfunction type  Monitor clinically    12. Need for hepatitis C screening test  Reviewed current recommendations for hepatitis C screening  - HCV AB for  1944 thru 1965 (Once in a lifetime); Future    13. Screening for prostate cancer  Continue annual surveillance  - PSA, Total (Screening) [E]; Future    14. Encounter for lipid screening for cardiovascular disease  Reviewed goals for lipids  - Lipid Panel [E]; Future    15. Screening, anemia, deficiency, iron  Check labs  - CBC W Differential W Platelet [E];  Future

## 2023-11-06 NOTE — IMAGING NOTE
Call placed to pt regarding CTA Gated Coronary. Instructed to arrive at 8:45 AM.  May eat a light breakfast/lunch but drink plenty of fluids a day before and morning of procedure. .   Advised to hold caffeine 12 hrs prior to procedure. Pt denies taking Viagra, Cialis, Levitra, Imdur. May take usual meds.

## 2023-11-08 ENCOUNTER — HOSPITAL ENCOUNTER (OUTPATIENT)
Dept: CT IMAGING | Facility: HOSPITAL | Age: 68
Discharge: HOME | End: 2023-11-08
Attending: INTERNAL MEDICINE
Payer: MEDICARE

## 2023-11-08 VITALS
DIASTOLIC BLOOD PRESSURE: 51 MMHG | OXYGEN SATURATION: 94 % | SYSTOLIC BLOOD PRESSURE: 91 MMHG | HEART RATE: 57 BPM | TEMPERATURE: 98 F | RESPIRATION RATE: 22 BRPM

## 2023-11-08 DIAGNOSIS — R06.09 DOE (DYSPNEA ON EXERTION): ICD-10-CM

## 2023-11-08 DIAGNOSIS — R07.9 CHEST PAIN: ICD-10-CM

## 2023-11-08 LAB
CREAT BLD-MCNC: 0.9 MG/DL
EGFRCR SERPLBLD CKD-EPI 2021: 94 ML/MIN/1.73M2 (ref 60–?)

## 2023-11-08 PROCEDURE — 75574 CT ANGIO HRT W/3D IMAGE: CPT | Performed by: INTERNAL MEDICINE

## 2023-11-08 PROCEDURE — 82565 ASSAY OF CREATININE: CPT

## 2023-11-08 RX ORDER — NITROGLYCERIN 0.4 MG/1
0.4 TABLET SUBLINGUAL ONCE
Status: COMPLETED | OUTPATIENT
Start: 2023-11-08 | End: 2023-11-08

## 2023-11-08 RX ORDER — NITROGLYCERIN 0.4 MG/1
TABLET SUBLINGUAL
Status: COMPLETED
Start: 2023-11-08 | End: 2023-11-08

## 2023-11-08 RX ADMIN — NITROGLYCERIN 0.4 MG: 0.4 TABLET SUBLINGUAL at 09:40:00

## 2023-11-08 NOTE — IMAGING NOTE
Pt arrives to room CT 4 at 09:28 AM. Working with [address] . IV established by Justin Garcia RN to right [facility] with 20 gauge angiocath x 1 attempt. Pt denies long acting nitrates. Pt positioned on CT table comfortably. Procedure explained and questions answered. O2 applied via NC at 2 LPM. VSS as noted in flowsheet. GFR = 94   imaging started at 09:36 AM    0.9NS flush followed by Omnipaque contrast at 09:45 AM    omnipaque contrast = 85 mL  0.9NS = 65 mL  Average HR = 53    Pt tolerated procedure without complication. Denies s/sx of contrast reaction. IV dc'd intact at 09:49 AM. Gauze and coban applied to site. Pt A/O x 4 and denies pain. Ambulatory and in stable condition.  Dc'd to home at 09:51 AM

## 2023-12-21 ENCOUNTER — MED REC SCAN ONLY (OUTPATIENT)
Dept: FAMILY MEDICINE CLINIC | Facility: CLINIC | Age: 68
End: 2023-12-21

## 2024-03-19 ENCOUNTER — TELEPHONE (OUTPATIENT)
Dept: FAMILY MEDICINE CLINIC | Facility: CLINIC | Age: 69
End: 2024-03-19

## 2024-03-19 NOTE — TELEPHONE ENCOUNTER
Pre-op px scheduled on 4/24/24.    Fax sent to Dr. Mccracken's ofc @ [phone] (ph# [phone]) requesting pre-op orders to be faxed to our office.

## 2024-03-19 NOTE — TELEPHONE ENCOUNTER
PT NEEDS PRE OP APPT FOR CARPAL TUNNEL SURGERY 5/7 WITH DR PRUITT @ [facility] IN [place], CALL PT TO MAKE APPT

## 2024-04-24 ENCOUNTER — HOSPITAL ENCOUNTER (OUTPATIENT)
Dept: GENERAL RADIOLOGY | Age: 69
Discharge: HOME | End: 2024-04-24
Attending: FAMILY MEDICINE
Payer: MEDICARE

## 2024-04-24 ENCOUNTER — OFFICE VISIT (OUTPATIENT)
Dept: FAMILY MEDICINE CLINIC | Facility: CLINIC | Age: 69
End: 2024-04-24
Payer: MEDICARE

## 2024-04-24 ENCOUNTER — LABORATORY ENCOUNTER (OUTPATIENT)
Dept: LAB | Age: 69
End: 2024-04-24
Attending: FAMILY MEDICINE
Payer: MEDICARE

## 2024-04-24 VITALS
RESPIRATION RATE: 18 BRPM | DIASTOLIC BLOOD PRESSURE: 78 MMHG | HEIGHT: 72 IN | BODY MASS INDEX: 33.05 KG/M2 | OXYGEN SATURATION: 94 % | WEIGHT: 244 LBS | SYSTOLIC BLOOD PRESSURE: 122 MMHG | HEART RATE: 63 BPM | TEMPERATURE: 98 F

## 2024-04-24 DIAGNOSIS — G56.03 CARPAL TUNNEL SYNDROME, BILATERAL: ICD-10-CM

## 2024-04-24 DIAGNOSIS — K21.9 HIATAL HERNIA WITH GERD: ICD-10-CM

## 2024-04-24 DIAGNOSIS — G47.33 OSA ON CPAP: ICD-10-CM

## 2024-04-24 DIAGNOSIS — Z13.1 SCREENING FOR DIABETES MELLITUS: ICD-10-CM

## 2024-04-24 DIAGNOSIS — Z01.818 PRE-OP EVALUATION: ICD-10-CM

## 2024-04-24 DIAGNOSIS — F17.201 TOBACCO DEPENDENCE IN REMISSION: ICD-10-CM

## 2024-04-24 DIAGNOSIS — Z96.652 STATUS POST LEFT KNEE REPLACEMENT: ICD-10-CM

## 2024-04-24 DIAGNOSIS — I10 ESSENTIAL HYPERTENSION: ICD-10-CM

## 2024-04-24 DIAGNOSIS — Z96.642 S/P HIP REPLACEMENT, LEFT: ICD-10-CM

## 2024-04-24 DIAGNOSIS — Z13.220 SCREENING FOR LIPID DISORDERS: ICD-10-CM

## 2024-04-24 DIAGNOSIS — R06.02 SOB (SHORTNESS OF BREATH) ON EXERTION: ICD-10-CM

## 2024-04-24 DIAGNOSIS — E66.9 OBESITY (BMI 30.0-34.9): ICD-10-CM

## 2024-04-24 DIAGNOSIS — Z91.013 SHELLFISH ALLERGY: ICD-10-CM

## 2024-04-24 DIAGNOSIS — K44.9 HIATAL HERNIA WITH GERD: ICD-10-CM

## 2024-04-24 DIAGNOSIS — Z01.818 PRE-OP EVALUATION: Primary | ICD-10-CM

## 2024-04-24 LAB
ALBUMIN SERPL-MCNC: 4.2 G/DL (ref 3.4–5)
ALBUMIN/GLOB SERPL: 1.4 {RATIO} (ref 1–2)
ALP LIVER SERPL-CCNC: 111 U/L
ALT SERPL-CCNC: 38 U/L
ANION GAP SERPL CALC-SCNC: 3 MMOL/L (ref 0–18)
AST SERPL-CCNC: 30 U/L (ref 15–37)
ATRIAL RATE: 53 BPM
BASOPHILS # BLD AUTO: 0.04 X10(3) UL (ref 0–0.2)
BASOPHILS NFR BLD AUTO: 0.4 %
BILIRUB SERPL-MCNC: 0.6 MG/DL (ref 0.1–2)
BUN BLD-MCNC: 15 MG/DL (ref 9–23)
CALCIUM BLD-MCNC: 9.4 MG/DL (ref 8.5–10.1)
CHLORIDE SERPL-SCNC: 109 MMOL/L (ref 98–112)
CO2 SERPL-SCNC: 27 MMOL/L (ref 21–32)
CREAT BLD-MCNC: 1.17 MG/DL
EGFRCR SERPLBLD CKD-EPI 2021: 68 ML/MIN/1.73M2 (ref 60–?)
EOSINOPHIL # BLD AUTO: 0.42 X10(3) UL (ref 0–0.7)
EOSINOPHIL NFR BLD AUTO: 4.5 %
ERYTHROCYTE [DISTWIDTH] IN BLOOD BY AUTOMATED COUNT: 13.8 %
EST. AVERAGE GLUCOSE BLD GHB EST-MCNC: 117 MG/DL (ref 68–126)
FASTING STATUS PATIENT QL REPORTED: YES
GLOBULIN PLAS-MCNC: 3.1 G/DL (ref 2.8–4.4)
GLUCOSE BLD-MCNC: 82 MG/DL (ref 70–99)
HBA1C MFR BLD: 5.7 % (ref ?–5.7)
HCT VFR BLD AUTO: 45.3 %
HGB BLD-MCNC: 14.8 G/DL
IMM GRANULOCYTES # BLD AUTO: 0.04 X10(3) UL (ref 0–1)
IMM GRANULOCYTES NFR BLD: 0.4 %
LYMPHOCYTES # BLD AUTO: 2.65 X10(3) UL (ref 1–4)
LYMPHOCYTES NFR BLD AUTO: 28.2 %
MCH RBC QN AUTO: 30.5 PG (ref 26–34)
MCHC RBC AUTO-ENTMCNC: 32.7 G/DL (ref 31–37)
MCV RBC AUTO: 93.2 FL
MONOCYTES # BLD AUTO: 1 X10(3) UL (ref 0.1–1)
MONOCYTES NFR BLD AUTO: 10.6 %
NEUTROPHILS # BLD AUTO: 5.25 X10 (3) UL (ref 1.5–7.7)
NEUTROPHILS # BLD AUTO: 5.25 X10(3) UL (ref 1.5–7.7)
NEUTROPHILS NFR BLD AUTO: 55.9 %
OSMOLALITY SERPL CALC.SUM OF ELEC: 288 MOSM/KG (ref 275–295)
P AXIS: -20 DEGREES
P-R INTERVAL: 172 MS
PLATELET # BLD AUTO: 240 10(3)UL (ref 150–450)
POTASSIUM SERPL-SCNC: 4.3 MMOL/L (ref 3.5–5.1)
PROT SERPL-MCNC: 7.3 G/DL (ref 6.4–8.2)
Q-T INTERVAL: 438 MS
QRS DURATION: 90 MS
QTC CALCULATION (BEZET): 410 MS
R AXIS: 7 DEGREES
RBC # BLD AUTO: 4.86 X10(6)UL
SODIUM SERPL-SCNC: 139 MMOL/L (ref 136–145)
T AXIS: 34 DEGREES
VENTRICULAR RATE: 53 BPM
WBC # BLD AUTO: 9.4 X10(3) UL (ref 4–11)

## 2024-04-24 PROCEDURE — 85025 COMPLETE CBC W/AUTO DIFF WBC: CPT

## 2024-04-24 PROCEDURE — 36415 COLL VENOUS BLD VENIPUNCTURE: CPT

## 2024-04-24 PROCEDURE — 80053 COMPREHEN METABOLIC PANEL: CPT

## 2024-04-24 PROCEDURE — 99214 OFFICE O/P EST MOD 30 MIN: CPT | Performed by: FAMILY MEDICINE

## 2024-04-24 PROCEDURE — 80061 LIPID PANEL: CPT

## 2024-04-24 PROCEDURE — 99499 UNLISTED E&M SERVICE: CPT | Performed by: FAMILY MEDICINE

## 2024-04-24 PROCEDURE — 83036 HEMOGLOBIN GLYCOSYLATED A1C: CPT

## 2024-04-24 PROCEDURE — 93000 ELECTROCARDIOGRAM COMPLETE: CPT | Performed by: FAMILY MEDICINE

## 2024-04-24 PROCEDURE — 71046 X-RAY EXAM CHEST 2 VIEWS: CPT | Performed by: FAMILY MEDICINE

## 2024-04-24 RX ORDER — CARVEDILOL 3.12 MG/1
3.12 TABLET ORAL 2 TIMES DAILY WITH MEALS
COMMUNITY
Start: 2023-10-18

## 2024-04-24 RX ORDER — VERAPAMIL HYDROCHLORIDE 120 MG/1
120 TABLET, FILM COATED ORAL DAILY
COMMUNITY

## 2024-04-24 NOTE — H&P
Tyler Tineo is a 68 year old male   Chief Complaint   Patient presents with    Pre-Op Exam     HPI:   Pt presents for preoperative consultation as requested by Dr. Luna.  Patient is scheduled for right wrist endoscopic carpal tunnel release with regional anesthesia with sedation on 5/7/2024 at [facility] orthopedics ambulatory outpatient center  1 ppd x 30 off and on, quit 5-6 yrs  Wt Readings from Last 6 Encounters:   04/24/24 244 lb (110.7 kg)   10/11/23 232 lb (105.2 kg)   08/25/23 234 lb (106.1 kg)   03/08/23 234 lb (106.1 kg)   03/01/23 229 lb (103.9 kg)   02/24/23 238 lb (108 kg)     Body mass index is 33.09 kg/m².     Cholesterol, Total (mg/dL)   Date Value   10/11/2023 178   07/30/2022 183   03/27/2021 186     HDL Cholesterol (mg/dL)   Date Value   10/11/2023 60 (H)   07/30/2022 53   03/27/2021 58     LDL Cholesterol (mg/dL)   Date Value   10/11/2023 103 (H)   07/30/2022 118 (H)   03/27/2021 113 (H)     AST (U/L)   Date Value   07/30/2022 29   03/27/2021 18   09/07/2019 18     ALT (U/L)   Date Value   07/30/2022 32   03/27/2021 27   09/07/2019 27      Current Outpatient Medications   Medication Sig Dispense Refill    carvedilol 3.125 MG Oral Tab Take 1 tablet (3.125 mg total) by mouth 2 (two) times daily with meals.      verapamil 120 MG Oral Tab Take 1 tablet (120 mg total) by mouth daily.      lisinopril 30 MG Oral Tab Take 1 tablet (30 mg total) by mouth daily. 90 tablet 3    Lansoprazole 15 MG Oral Capsule Delayed Release Take 1 capsule (15 mg total) by mouth daily.       Allergies   Allergen Reactions    Other ANAPHYLAXIS     SHELL FISH    Shellfish Allergy ANAPHYLAXIS        Past Medical History:    Esophageal reflux    Essential hypertension    Obesity    Osteoarthritis    Unspecified sleep apnea    on CPAP      Past Surgical History:   Procedure Laterality Date    Arthroscopy of joint unlisted Left     RTC repair    Back surgery  01/02/2015    microdiscectomy L2 in [place]    Back surgery   03/01/2023    Lumbar fusion L1-L5    Colonoscopy  11/22/2010    @ [facility], tubular adenoma    Colonoscopy  04/09/2018    normal    Cta coronary w/o calcium score  11/08/2023    Less than 25% stenosis of the LAD, otherwise coronary arteries are clear, no calcifications of the aorta    [place] [place]    Ejection fraction 73%, otherwise normal    Fluor gid & loclzj ndl/cath spi dx/ther njx N/A 10/14/2014    Procedure: LUMBAR EPIDURAL;  Surgeon: Tyree Gutierrez MD;  Location: [facility]    Hip replacement surgery Left 09/18/2019    Injection, w/wo contrast, dx/therapeutic substance, epidural/subarachnoid; lumbar/sacral N/A 10/14/2014    Procedure: LUMBAR EPIDURAL;  Surgeon: Tyree Gutierrez MD;  Location: [facility]    Knee replacement surgery Left     Spine surgery procedure unlisted      Total hip replacement Left 09/18/2019        Vasectomy        Family History   Problem Relation Age of Onset    Cancer Father         lung    No Known Problems Mother     Cancer Sister         breast        Social History     Socioeconomic History    Marital status:    Tobacco Use    Smoking status: Former     Current packs/day: 1.00     Average packs/day: 1 pack/day for 51.3 years (51.3 ttl pk-yrs)     Types: Cigars, Cigarettes     Start date: 1973     Quit date: 2022     Passive exposure: Never    Smokeless tobacco: Never    Tobacco comments:     occ cigar   Vaping Use    Vaping status: Never Used   Substance and Sexual Activity    Alcohol use: Yes     Alcohol/week: 12.0 standard drinks of alcohol     Types: 12 Cans of beer per week     Comment: 2 beers per night, more on weekends only    Drug use: Never   Other Topics Concern    Caffeine Concern Yes     Comment: 2 liters    Exercise No    Seat Belt Yes    Special Diet No    Stress Concern Yes    Weight Concern No        Advanced Directives: + wife is POA   Specialists: Dr Willow Aguirre, Dr Arshad  Jeremy- orthopedics , Dr Aguilar- spine, [facility]ez  Occ: retired , : [employer]. Children: 3.   Exercise: stretches.  Diet: watches minimally     REVIEW OF SYSTEMS:   GENERAL: generally feels well, no fatigue, denies excessive daytime drowsiness, good appetite, weight is up 12 pounds over past 6 months  SKIN: denies any unusual skin lesions or rashes  EYES:denies blurred vision or double vision  ENT: denies nasal congestion, PND or ST, + snoring and reported periods of apnea-patient using CPAP 8-9 hrs nightly, denies hearing deficits  LUNGS: + stable shortness of breath with exertion , no coughing or wheezing, patient smoked 1 pack of cigarettes per day off and on for approximately 30 years, quit smoking 2019  CARDIOVASCULAR: denies chest pain/ pressure on exertion,denies palpations, anginal equivalent symptoms, no claudication , no peripheral edema, pt occasionally checks bp at home  120s-130s/80s, unremarkable cardiac workup for ischemic disease, follow-up appointment tomorrow  GI: denies abdominal pain,denies heartburn, constipation, diarrhea, or change in bowel habits  : denies dysuria, hesitancy,nocturia x 1, decreased urine stream, incontinence, erectile dysfunction, decreased libido   MUSCULOSKELETAL: denies joint pains, less back pain since surgery, fingers feel numb all the time chronically  NEURO: denies headaches, tremors, dizziness. Numb both hands digits 1-3, no weakness, no pain, difficulty with fine motor function such as buttoning buttons  PSYCHE: denies depression or anxiety,+ intermittent sleep difficulty,   HEMATOLOGIC: denies unexplained bruising or bleeding  ENDOCRINE: denies heat or cold intolerance , no unexplained wt loss or gain  PSYCHE: denies depression or anxiety, denies any sleep difficulty,   EXAM:   /78 (BP Location: Left arm, Patient Position: Sitting, Cuff Size: large)   Pulse 63   Temp 98.2 °F (36.8 °C) (Temporal)   Resp 18   Ht 6' (1.829 m)    Wt 244 lb (110.7 kg)   SpO2 94%   BMI 33.09 kg/m²   Body mass index is 33.09 kg/m².   GENERAL: well developed, well nourished,in no apparent distress  SKIN: no rashes,no suspicious lesions  ENT: EAC:  Clear, TMs: intact, Nose: turbinates normal, septum: Midline, discharge: None, Pharynx: Class II airway, no oral lesions  EYES:PERRLA, EOMI, normal optic disk,conjunctiva are clear  NECK: supple,no adenopathy,no bruits, no thyromegaly  LUNGS: clear to auscultation, no w/r/r  CARDIO: Heart rate and rhythm are irregularly irregular without murmur, no S3, S4, strong peripheral pulses, no peripheral edema  GI: +NBS's,no masses, HSM or tenderness  BACK: Flexion: Fingers to knees, patient unable to extend to neutral spine  EXTREMITIES: no cyanosis, clubbing or edema, FROM of all joints tested  NEURO: A &O X 3,cranial nerves are intact,motor grossly intact, DTRs +2/4 UE bilaterally, +1/4 lower extremities bilaterally, decreased sensation to fine touch and pinprick bilateral digits 1 through 3, positive Tinel's sign, positive Phalen sign bilaterally  PSYCH: affect: normal, speech: clear and coherent, Insight: appropriate  EKG: Sinus bradycardia, heart rate 53, FL interval 0.17, QRS interval 0.09, QRS axis +70 degrees, otherwise normal EKG  CXR:elevated left hemidiaphragm, no prior films for comparison  ASSESSMENT AND PLAN:   Tyler Tineo is a 68 year old male  Encounter Diagnoses   Name Primary?    Pre-op evaluation Yes    Carpal tunnel syndrome, bilateral     Essential hypertension     AILEEN on CPAP     Status post left knee replacement     S/P hip replacement, left     Shellfish allergy     Screening for diabetes mellitus     Obesity (BMI 30.0-34.9)     Tobacco dependence in remission     SOB (shortness of breath) on exertion     Hiatal hernia with GERD      Will check preoperative labs  Advised to avoid all aspirin and NSAID containing products 10 days prior to planned procedure.  Will need to closely monitor  sedated ventilatory status given obstructive sleep apnea  We will have patient schedule pulmonary function test to further evaluate lung function.  Strongly suspect deconditioning, obesity, and hiatal hernia play a role.  I would not anticipate this to significantly increase perioperative risk.  Patient is medically cleared for procedure as planned may proceed with surgery as scheduled.  A copy of this consultation will be forwarded to Dr. Andrade for review    Lane Charles DO, [facility]  .  Orders Placed This Encounter   Procedures    Comp Metabolic Panel (14) [E]    CBC W Differential W Platelet [E]    Hemoglobin A1C [E]     Meds & Refills for this Visit:  Requested Prescriptions      No prescriptions requested or ordered in this encounter     Imaging & Consults:  ELECTROCARDIOGRAM, COMPLETE  No follow-ups on file.  There are no Patient Instructions on file for this visit.

## 2024-04-26 LAB
CHOLEST SERPL-MCNC: 200 MG/DL (ref ?–200)
HDLC SERPL-MCNC: 57 MG/DL (ref 40–59)
LDLC SERPL CALC-MCNC: 128 MG/DL (ref ?–100)
NONHDLC SERPL-MCNC: 143 MG/DL (ref ?–130)
TRIGL SERPL-MCNC: 85 MG/DL (ref 30–149)
VLDLC SERPL CALC-MCNC: 15 MG/DL (ref 0–30)

## 2024-04-30 ENCOUNTER — MED REC SCAN ONLY (OUTPATIENT)
Dept: FAMILY MEDICINE CLINIC | Facility: CLINIC | Age: 69
End: 2024-04-30

## 2024-05-20 ENCOUNTER — RT VISIT (OUTPATIENT)
Dept: RESPIRATORY THERAPY | Facility: HOSPITAL | Age: 69
End: 2024-05-20
Attending: FAMILY MEDICINE

## 2024-05-20 DIAGNOSIS — R06.02 SOB (SHORTNESS OF BREATH) ON EXERTION: ICD-10-CM

## 2024-05-20 DIAGNOSIS — F17.201 TOBACCO DEPENDENCE IN REMISSION: ICD-10-CM

## 2024-05-20 PROCEDURE — 94726 PLETHYSMOGRAPHY LUNG VOLUMES: CPT | Performed by: INTERNAL MEDICINE

## 2024-05-20 PROCEDURE — 94010 BREATHING CAPACITY TEST: CPT | Performed by: INTERNAL MEDICINE

## 2024-05-20 PROCEDURE — 94729 DIFFUSING CAPACITY: CPT | Performed by: INTERNAL MEDICINE

## 2024-05-23 NOTE — PROCEDURES
Pulmonary Function Test:   Findings:  Spirometry: FEV1 is 1.94 L, 57% with a Z-score of -257 predicted. FVC is 2.54 L, 57% with a Z-score of -2.81 predicted and FEV1/ FVC ratio is 0.76.  The flow-volume loop demonstrates a restrictive pattern.   Lung Volumes:                                                                     The TLC is 5.03 L, 66% predicted with a Z-score of -2.76.  The residual volume 2.49 L, 95% predicted.  Diffusion Capacity:  The diffusion capacity is 23.04 or 83% with a Z-score of -1.06 predicted and 126% predicted when corrected for alveolar volume.     Impression:  There is moderate  restrictive process  on spirometry and visualized on flow-volume loop. predicted).   .  Lung volumes show moderate restriction with total lung capacity of 5.03 L, 66% predicted and Z-score of -2.76. This can be seen in heart failure, fluid overload states, interstitial lung disease, thoracic spine/chest disorders, obesity as well as other clinical scenarios.  Further clinical correlation required.    Diffusion capacity appears within normal limits     There are no previous pulmonary function tests available for comparison.     Disclaimer: This PFT has been interpreted in accordance to ATS/ERS interpretation guidelines 2022, with the use of upper and lower limits of normal as well as z-score references. Previous testing (before March 2024) was not performed at [facility] using z-scores and so comparison to previous testing should be taken with caution.          Shan Maravilla MD

## 2024-05-30 ENCOUNTER — TELEPHONE (OUTPATIENT)
Dept: FAMILY MEDICINE CLINIC | Facility: CLINIC | Age: 69
End: 2024-05-30

## 2024-06-12 ENCOUNTER — TELEPHONE (OUTPATIENT)
Dept: FAMILY MEDICINE CLINIC | Facility: CLINIC | Age: 69
End: 2024-06-12

## 2024-06-12 ENCOUNTER — OFFICE VISIT (OUTPATIENT)
Dept: FAMILY MEDICINE CLINIC | Facility: CLINIC | Age: 69
End: 2024-06-12
Payer: MEDICARE

## 2024-06-12 VITALS
BODY MASS INDEX: 32.31 KG/M2 | TEMPERATURE: 98 F | OXYGEN SATURATION: 93 % | DIASTOLIC BLOOD PRESSURE: 78 MMHG | WEIGHT: 243.81 LBS | HEIGHT: 73 IN | RESPIRATION RATE: 28 BRPM | SYSTOLIC BLOOD PRESSURE: 132 MMHG | HEART RATE: 75 BPM

## 2024-06-12 DIAGNOSIS — G47.33 OSA ON CPAP: ICD-10-CM

## 2024-06-12 DIAGNOSIS — J98.01 ACUTE BRONCHOSPASM DUE TO VIRAL INFECTION: Primary | ICD-10-CM

## 2024-06-12 DIAGNOSIS — I10 ESSENTIAL HYPERTENSION: ICD-10-CM

## 2024-06-12 DIAGNOSIS — J00 ACUTE NASOPHARYNGITIS: ICD-10-CM

## 2024-06-12 DIAGNOSIS — B34.9 ACUTE BRONCHOSPASM DUE TO VIRAL INFECTION: Primary | ICD-10-CM

## 2024-06-12 PROCEDURE — 94640 AIRWAY INHALATION TREATMENT: CPT | Performed by: FAMILY MEDICINE

## 2024-06-12 PROCEDURE — 99214 OFFICE O/P EST MOD 30 MIN: CPT | Performed by: FAMILY MEDICINE

## 2024-06-12 RX ORDER — IPRATROPIUM BROMIDE AND ALBUTEROL SULFATE 2.5; .5 MG/3ML; MG/3ML
3 SOLUTION RESPIRATORY (INHALATION) ONCE
Status: SHIPPED | OUTPATIENT
Start: 2024-06-12

## 2024-06-12 RX ORDER — ROSUVASTATIN CALCIUM 10 MG/1
10 TABLET, COATED ORAL DAILY
COMMUNITY
Start: 2024-05-31

## 2024-06-12 RX ORDER — FLUTICASONE FUROATE 200 UG/1
1 POWDER RESPIRATORY (INHALATION) DAILY
Qty: 1 EACH | Refills: 0 | Status: SHIPPED | OUTPATIENT
Start: 2024-06-12

## 2024-06-12 RX ORDER — PREDNISONE 20 MG/1
20 TABLET ORAL 2 TIMES DAILY
Qty: 10 TABLET | Refills: 0 | Status: SHIPPED | OUTPATIENT
Start: 2024-06-12 | End: 2024-06-17

## 2024-06-12 RX ORDER — ALBUTEROL SULFATE 90 UG/1
2 AEROSOL, METERED RESPIRATORY (INHALATION) EVERY 4 HOURS PRN
Qty: 1 EACH | Refills: 0 | Status: SHIPPED | OUTPATIENT
Start: 2024-06-12

## 2024-06-12 NOTE — TELEPHONE ENCOUNTER
Rec'd fax from [facility] stating Q-Keeley isn't covered.  The suggested alternative is Arnuity Ellipta.    Per Dr. Charles---okay to change to Arnuity Ellipta 200mcg  1 inhalation daily.  New script sent to [facility]

## 2024-06-12 NOTE — PROGRESS NOTES
HPI:   Tyler Tineo is a 68 year old male who presents for upper respiratory symptoms for  1  days. Patient reports congestion, clear colored nasal discharge, dry cough, cough is keeping pt up at night, wheezing, denies fever.  \"Can't breath\" since yesterday  Started rosuvastatin last week, patient saw cardiologist 6 weeks ago  Patient's wife was sick with similar symptoms a week ago  Patient denies any exposure to any fumes, dust, cleaning fluids etc.  Current Outpatient Medications   Medication Sig Dispense Refill    rosuvastatin 10 MG Oral Tab Take 1 tablet (10 mg total) by mouth daily.      carvedilol 3.125 MG Oral Tab Take 1 tablet (3.125 mg total) by mouth 2 (two) times daily with meals.      verapamil 120 MG Oral Tab Take 1 tablet (120 mg total) by mouth daily.      lisinopril 30 MG Oral Tab Take 1 tablet (30 mg total) by mouth daily. 90 tablet 3    Lansoprazole 15 MG Oral Capsule Delayed Release Take 1 capsule (15 mg total) by mouth daily.        Past Medical History:    Esophageal reflux    Essential hypertension    Obesity    Osteoarthritis    Unspecified sleep apnea    on CPAP      Past Surgical History:   Procedure Laterality Date    Arthroscopy of joint unlisted Left     RTC repair    Back surgery  01/02/2015    microdiscectomy L2 in [place]    Back surgery  03/01/2023    Lumbar fusion L1-L5    Colonoscopy  11/22/2010    @ [facility], tubular adenoma    Colonoscopy  04/09/2018    normal    Cta coronary w/o calcium score  11/08/2023    Less than 25% stenosis of the LAD, otherwise coronary arteries are clear, no calcifications of the aorta    [place] [place]  12[place]14/2023    Ejection fraction 73%, otherwise normal    Fluor gid & loclzj ndl/cath spi dx/ther njx N/A 10/14/2014    Procedure: LUMBAR EPIDURAL;  Surgeon: Tyree Gutierrez MD;  Location: [facility]    Hip replacement surgery Left 09/18/2019    Injection, w/wo contrast, dx/therapeutic substance,  epidural/subarachnoid; lumbar/sacral N/A 10/14/2014    Procedure: LUMBAR EPIDURAL;  Surgeon: Tyree Gutierrez MD;  [place] [facility]    Knee replacement surgery Left     Spine surgery procedure unlisted      Total hip replacement Left 09/18/2019        Vasectomy        Family History   Problem Relation Age of Onset    Cancer Father         lung    No Known Problems Mother     Cancer Sister         breast      Social History     Socioeconomic History    Marital status:    Tobacco Use    Smoking status: Former     Current packs/day: 1.00     Average packs/day: 1 pack/day for 51.4 years (51.4 ttl pk-yrs)     Types: Cigars, Cigarettes     Start date: 1973     Quit date: 2022     Passive exposure: Never    Smokeless tobacco: Never    Tobacco comments:     occ cigar   Vaping Use    Vaping status: Never Used   Substance and Sexual Activity    Alcohol use: Yes     Alcohol/week: 12.0 standard drinks of alcohol     Types: 12 Cans of beer per week     Comment: 2 beers per night, more on weekends only    Drug use: Never   Other Topics Concern    Caffeine Concern Yes     Comment: 2 liters    Exercise No    Seat Belt Yes    Special Diet No    Stress Concern Yes    Weight Concern No         REVIEW OF SYSTEMS:   GENERAL: fever: no, chills:no, fatigue:  no  SKIN: no rashes  EYES:denies itching, discharge, irritation  ENT:  see hpi, denies ST, ear pain  LUNGS: + shortness of breath ,+ cough, no sputum, + wheezing,+ chest tightness  CARDIOVASCULAR: denies chest pain , palpatations  GI: no nausea or abdominal pain  NEURO: denies headaches    EXAM:   /78 (BP Location: Left arm, Patient Position: Sitting, Cuff Size: large)   Pulse 75   Temp 97.5 °F (36.4 °C) (Temporal)   Resp (!) 28   Ht 6' 1\" (1.854 m)   Wt 243 lb 12.8 oz (110.6 kg)   SpO2 93%   BMI 32.17 kg/m²   GENERAL: pt appears uncomfortable ,sitting in a tripod position  SKIN: warm and dry, no rashes,   EYES:  conjunctiva are  clear, lids and lashes normal  ENT: TMs: normal, Turbinates :congested, Discharge:+, Pharynx: Moist oral mucosa, no oral lesions, Tonsils: Absent  NECK: supple,no adenopathy  LUNGS: Tight inspiratory and expiratory wheezes, decreased air exchange  CARDIO: RRR without murmur  GI: good BS's,no masses, HSM or tenderness  Albuterol/ipratropium nebulizer treatment given  Posttreatment eval: Lungs with diffuse expiratory wheezes, improved air exchange, patient appears more comfortable  ASSESSMENT AND PLAN:   Tyler Tineo is a 68 year old male who presents with   Encounter Diagnoses   Name Primary?    Acute bronchospasm due to viral infection Yes    Acute nasopharyngitis     Essential hypertension     AILEEN on CPAP      No orders of the defined types were placed in this encounter.    Meds & Refills for this Visit:  Requested Prescriptions     Signed Prescriptions Disp Refills    predniSONE 20 MG Oral Tab 10 tablet 0     Sig: Take 1 tablet (20 mg total) by mouth 2 (two) times daily for 5 days.    albuterol 108 (90 Base) MCG/ACT Inhalation Aero Soln 1 each 0     Sig: Inhale 2 puffs into the lungs every 4 (four) hours as needed for Wheezing or Shortness of Breath.    Beclomethasone Diprop HFA 80 MCG/ACT Inhalation Aerosol, Breath Activated 1 each 0     Sig: Inhale 2 puffs into the lungs in the morning and 2 puffs before bedtime. Rinse mouth after use.     Imaging & Consults:  None  No follow-ups on file.  Patient Instructions   Total 30 minutes spent with the patient.  Discussed diagnosis and possible contributing factors.  Discussed symptom specific treatment for head and nasal congestion including nasal saline, Sudafed, Flonase etc.  Will start prednisone 20 mg twice daily x 5 days  Beclomethasone 80 mcg/puff, 2 puffs twice daily, rinse mouth after use  Albuterol 2 puffs every 4 hours as needed for cough, wheezing, chest tightness  Call with an update in 24 hours and response to medication.  Symptom limited activity  modification discussed  Symptomatic treatment reviewed  Infection control reviewed  The patient indicates understanding of these issues and agrees to the plan.  The patient is asked to return if sx's persist or worsen.   Lane Charles , DO

## 2024-06-12 NOTE — TELEPHONE ENCOUNTER
Spoke with pt. States he started with cold sx 2 days ago, worse yesterday.  C/o chest congestion, non-productive cough, felt very SOB, \"hard to get a deep breath\" while laying flat/ trying to sleep last night. No fever, no chest pain.  Pt scheduled an appt at 1:45.  Advised we can see him @ 9;15 instead.  Appt rescheduled

## 2024-06-12 NOTE — PATIENT INSTRUCTIONS
Total 30 minutes spent with the patient.  Discussed diagnosis and possible contributing factors.  Discussed symptom specific treatment for head and nasal congestion including nasal saline, Sudafed, Flonase etc.  Will start prednisone 20 mg twice daily x 5 days  Beclomethasone 80 mcg/puff, 2 puffs twice daily, rinse mouth after use  Albuterol 2 puffs every 4 hours as needed for cough, wheezing, chest tightness  Call with an update in 24 hours and response to medication.  Symptom limited activity modification discussed

## 2024-06-12 NOTE — TELEPHONE ENCOUNTER
Patient wife called and stated that the pharmacy told her that one of the medications is not covered by insurance and that they had tried contacting Dr for an alternative. She is at the pharmacy now and wanting to know if she should wait.

## 2024-07-09 NOTE — TELEPHONE ENCOUNTER
Last office visit:10/11/23   Last filled:06/12/24 1 Each   Last labs:04/24/24    No future appointments.    Protocol:    Asthma & COPD Medication Protocol [id number]/09/2024 06:47 AM   Protocol Details Asthma Action Score greater than or equal to 20    AAP/ACT given in last 12 months    Appointment in past 6 or next 3 months

## 2024-07-10 RX ORDER — ALBUTEROL SULFATE 90 UG/1
2 AEROSOL, METERED RESPIRATORY (INHALATION) EVERY 4 HOURS PRN
Qty: 8.5 EACH | Refills: 0 | Status: SHIPPED | OUTPATIENT
Start: 2024-07-10

## 2024-08-06 ENCOUNTER — TELEPHONE (OUTPATIENT)
Dept: FAMILY MEDICINE CLINIC | Facility: CLINIC | Age: 69
End: 2024-08-06

## 2024-08-31 RX ORDER — LISINOPRIL 30 MG/1
30 TABLET ORAL DAILY
Qty: 90 TABLET | Refills: 1 | Status: SHIPPED | OUTPATIENT
Start: 2024-08-31

## 2024-08-31 NOTE — TELEPHONE ENCOUNTER
Last office visit:10/11/23   Last filled:10/11/23 #90 with 3 RF   Last labs:4/24/24   No future appointments.    Protocol:  Hypertension Medications Protocol [id number]/31/2024 09:57 AM   Protocol Details CMP or BMP in past 12 months    Last BP reading less than 140/90    In person appointment or virtual visit in the past 12 mos or appointment in next 3 mos    EGFRCR or GFRNAA > 50

## 2025-01-21 ENCOUNTER — TELEPHONE (OUTPATIENT)
Dept: FAMILY MEDICINE CLINIC | Facility: CLINIC | Age: 70
End: 2025-01-21

## 2025-04-04 ENCOUNTER — PATIENT MESSAGE (OUTPATIENT)
Dept: FAMILY MEDICINE CLINIC | Facility: CLINIC | Age: 70
End: 2025-04-04

## 2025-04-04 ENCOUNTER — OFFICE VISIT (OUTPATIENT)
Dept: FAMILY MEDICINE CLINIC | Facility: CLINIC | Age: 70
End: 2025-04-04
Payer: MEDICARE

## 2025-04-04 VITALS
TEMPERATURE: 98 F | SYSTOLIC BLOOD PRESSURE: 122 MMHG | HEART RATE: 61 BPM | BODY MASS INDEX: 32 KG/M2 | DIASTOLIC BLOOD PRESSURE: 70 MMHG | WEIGHT: 241 LBS | OXYGEN SATURATION: 100 % | RESPIRATION RATE: 22 BRPM

## 2025-04-04 DIAGNOSIS — J30.1 SEASONAL ALLERGIC RHINITIS DUE TO POLLEN: ICD-10-CM

## 2025-04-04 DIAGNOSIS — E66.811 OBESITY (BMI 30.0-34.9): ICD-10-CM

## 2025-04-04 DIAGNOSIS — J98.01 COUGH DUE TO BRONCHOSPASM: Primary | ICD-10-CM

## 2025-04-04 PROCEDURE — 99213 OFFICE O/P EST LOW 20 MIN: CPT | Performed by: FAMILY MEDICINE

## 2025-04-04 RX ORDER — PREDNISONE 20 MG/1
20 TABLET ORAL 2 TIMES DAILY
Qty: 10 TABLET | Refills: 0 | Status: SHIPPED | OUTPATIENT
Start: 2025-04-04 | End: 2025-04-09

## 2025-04-04 RX ORDER — CODEINE PHOSPHATE AND GUAIFENESIN 10; 100 MG/5ML; MG/5ML
5 SOLUTION ORAL 3 TIMES DAILY PRN
Qty: 240 ML | Refills: 0 | Status: SHIPPED | OUTPATIENT
Start: 2025-04-04

## 2025-04-04 NOTE — PATIENT INSTRUCTIONS
Discussed likely allergic trigger.  Recommend Claritin 10 mg daily  Continue Arnuity, may use albuterol on an as needed basis  Prednisone 20 mg twice daily x 5 days  Guaifenesin with codeine syrup, 5 mL up to 3 times daily, patient warned of potential sedation

## 2025-04-04 NOTE — PROGRESS NOTES
HPI:   Tyler Tineo is a 69 year old male who presents for upper respiratory symptoms for  2  weeks. Patient reports sore throat only at the beginning of sx's, clear colored nasal discharge, cough with scant colored sputum, cough is keeping pt up at night, denies fever, denies sinus pain.  Chief Complaint   Patient presents with    Cough     2 wks - worse at night       Not using albuterol  Patient states she also wants to lose weight but is not very active admittedly.  He is trying to eat less starch and calories  Current Outpatient Medications   Medication Sig Dispense Refill    LISINOPRIL 30 MG Oral Tab TAKE 1 TABLET BY MOUTH EVERY DAY 90 tablet 1    ALBUTEROL 108 (90 Base) MCG/ACT Inhalation Aero Soln INHALE 2 PUFFS INTO THE LUNGS EVERY 4 HOURS AS NEEDED FOR WHEEZING OR SHORTNESS OF BREATH. 8.5 each 0    rosuvastatin 10 MG Oral Tab Take 1 tablet (10 mg total) by mouth daily.      ARNUITY ELLIPTA 200 MCG/ACT Inhalation Aerosol Powder, Breath Activated Inhale 1 puff into the lungs daily. 1 each 0    carvedilol 3.125 MG Oral Tab Take 1 tablet (3.125 mg total) by mouth 2 (two) times daily with meals.      verapamil 120 MG Oral Tab Take 1 tablet (120 mg total) by mouth daily.      Lansoprazole 15 MG Oral Capsule Delayed Release Take 1 capsule (15 mg total) by mouth daily.        Past Medical History:    Esophageal reflux    Essential hypertension    Obesity    Osteoarthritis    Unspecified sleep apnea    on CPAP      Past Surgical History:   Procedure Laterality Date    Arthroscopy of joint unlisted Left     RTC repair    Back surgery  01/02/2015    microdiscectomy L2 in [place]    Back surgery  03/01/2023    Lumbar fusion L1-L5    Colonoscopy  11/22/2010    @ [facility], tubular adenoma    Colonoscopy  04/09/2018    normal    Cta coronary w/o calcium score  11/08/2023    Less than 25% stenosis of the LAD, otherwise coronary arteries are clear, no calcifications of the aorta    Echo 2d dp/clrfl, cardio (dmg)   12/14/2023    Ejection fraction 73%, otherwise normal    Fluor gid & loclzj ndl/cath spi dx/ther njx N/A 10/14/2014    Procedure: LUMBAR EPIDURAL;  Surgeon: Tyree Gutierrez MD;  Location: [facility]    Hip replacement surgery Left 09/18/2019    Injection, w/wo contrast, dx/therapeutic substance, epidural/subarachnoid; lumbar/sacral N/A 10/14/2014    Procedure: LUMBAR EPIDURAL;  Surgeon: Tyree Gutierrez MD;  Location: [facility]    Knee replacement surgery Left     Spine surgery procedure unlisted      Total hip replacement Left 09/18/2019        Vasectomy        Family History   Problem Relation Age of Onset    Cancer Father         lung    No Known Problems Mother     Cancer Sister         breast      Social History     Socioeconomic History    Marital status:    Tobacco Use    Smoking status: Former     Current packs/day: 1.00     Average packs/day: 1 pack/day for 52.3 years (52.3 ttl pk-yrs)     Types: Cigars, Cigarettes     Start date: 1973     Quit date: 2022     Passive exposure: Never    Smokeless tobacco: Never    Tobacco comments:     occ cigar   Vaping Use    Vaping status: Never Used   Substance and Sexual Activity    Alcohol use: Not Currently     Alcohol/week: 12.0 standard drinks of alcohol     Types: 12 Cans of beer per week     Comment: 2 beers per night, more on weekends only    Drug use: Never   Other Topics Concern    Caffeine Concern Yes     Comment: 2 liters    Exercise No    Seat Belt Yes    Special Diet No    Stress Concern Yes    Weight Concern No         REVIEW OF SYSTEMS:   GENERAL: fever: no, chills:no, fatigue:  +  SKIN: no rashes  EYES:denies itching, discharge, irritation  ENT:  see hpi, + runny nose, postnasal drip, denies sore throat, sinus pressure, ear pain  LUNGS: no shortness of breath ,+ cough, scant sputum, no wheezing,no chest tightness  CARDIOVASCULAR: denies chest pain , palpatations  GI: no nausea or abdominal  pain  NEURO: denies headaches  Sleep is disrupted from postnasal drip and coughing  EXAM:   /70 (BP Location: Left arm, Patient Position: Sitting, Cuff Size: adult)   Pulse 61   Temp 97.7 °F (36.5 °C) (Temporal)   Resp 22   Wt 241 lb (109.3 kg)   SpO2 100%   BMI 31.80 kg/m²   GENERAL: well developed, well nourished,in no apparent distress  SKIN: warm and dry, no rashes,   EYES:  conjunctiva are clear, lids and lashes normal  ENT: TMs: normal, Turbinates :slight congestion, Discharge:none, Pharynx: +pnd, no erythema, Tonsils:absent, no facial tenderness to palppation  NECK: supple,no adenopathy  LUNGS: CTA  CARDIO: RRR without murmur  GI: good BS's,no masses, HSM or tenderness    ASSESSMENT AND PLAN:   Tyler Tineo is a 69 year old male who presents with   Encounter Diagnoses   Name Primary?    Cough due to bronchospasm Yes    Seasonal allergic rhinitis due to pollen     Obesity (BMI 30.0-34.9)      No orders of the defined types were placed in this encounter.    Meds & Refills for this Visit:  Requested Prescriptions     Signed Prescriptions Disp Refills    predniSONE 20 MG Oral Tab 10 tablet 0     Sig: Take 1 tablet (20 mg total) by mouth 2 (two) times daily for 5 days.    guaiFENesin-codeine 100-10 MG/5ML Oral Solution 240 mL 0     Sig: Take 5 mL by mouth 3 (three) times daily as needed.     Imaging & Consults:  None  No follow-ups on file.  Patient Instructions   Discussed likely allergic trigger.  Recommend Claritin 10 mg daily  Continue Arnuity, may use albuterol on an as needed basis  Prednisone 20 mg twice daily x 5 days  Guaifenesin with codeine syrup, 5 mL up to 3 times daily, patient warned of potential sedation  Symptomatic treatment reviewed  Infection control reviewed  The patient indicates understanding of these issues and agrees to the plan.  The patient is asked to return if sx's persist or worsen.   Lane Charles , DO

## 2025-04-18 RX ORDER — LISINOPRIL 30 MG/1
30 TABLET ORAL DAILY
Qty: 30 TABLET | Refills: 0 | Status: SHIPPED | OUTPATIENT
Start: 2025-04-18

## 2025-04-18 RX ORDER — LISINOPRIL 30 MG/1
30 TABLET ORAL DAILY
Qty: 30 TABLET | Refills: 0 | OUTPATIENT
Start: 2025-04-18

## 2025-04-18 NOTE — TELEPHONE ENCOUNTER
OV 04/04/25 acute  LABS 04/24/24    REFILL 08/31/24 #90 +1 RF - dispensed 01/22/25 #90    No future appointments. Mychart reminder for labs and physical sent to patient. 30 day refill.     BP Readings from Last 3 Encounters:   04/04/25 122/70   06/12/24 132/78   04/24/24 122/78

## 2025-05-07 RX ORDER — LISINOPRIL 30 MG/1
30 TABLET ORAL DAILY
Qty: 90 TABLET | Refills: 1 | OUTPATIENT
Start: 2025-05-07

## 2025-05-07 NOTE — TELEPHONE ENCOUNTER
Last refill lisinopril 30 mg was 04/18/25 #30    Refill requested too soon - not due until 05/17/25.     Request denied at this time.

## 2025-06-04 RX ORDER — LISINOPRIL 30 MG/1
30 TABLET ORAL DAILY
Qty: 30 TABLET | Refills: 0 | Status: SHIPPED | OUTPATIENT
Start: 2025-06-04

## 2025-06-04 RX ORDER — ROSUVASTATIN CALCIUM 10 MG/1
10 TABLET, COATED ORAL DAILY
Qty: 30 TABLET | Refills: 0 | Status: SHIPPED | OUTPATIENT
Start: 2025-06-04

## 2025-06-05 RX ORDER — LISINOPRIL 30 MG/1
30 TABLET ORAL DAILY
Qty: 90 TABLET | Refills: 1 | OUTPATIENT
Start: 2025-06-05

## 2025-06-25 NOTE — TELEPHONE ENCOUNTER
Patient is due for physical and BP follow up. Please call patient. Read [employer] message with last refill request but did not schedule. Spoke with pt and gave medication instruction. Pt gave verbal understanding and scheduled appt for 7/21/2025 due to pt being out of town in 2 wks

## 2025-07-09 RX ORDER — LISINOPRIL 30 MG/1
30 TABLET ORAL DAILY
Qty: 90 TABLET | Refills: 0 | Status: SHIPPED | OUTPATIENT
Start: 2025-07-09

## 2025-07-09 NOTE — TELEPHONE ENCOUNTER
LAST LABS 04/24/24    Left voicemail for patient to call back    Spoke with patient - states he is out of town for the next few weeks and will call back to schedule.

## 2025-07-09 NOTE — TELEPHONE ENCOUNTER
Last office visit: 4/4/25  No future appointments.  Last filled: 6/4/25 Qty 30 R 0  Last labs: 4/24/25  Last BP: 122/70 as of 4/4/25

## 2025-07-24 ENCOUNTER — PATIENT OUTREACH (OUTPATIENT)
Dept: FAMILY MEDICINE CLINIC | Facility: CLINIC | Age: 70
End: 2025-07-24

## 2025-07-29 ENCOUNTER — OFFICE VISIT (OUTPATIENT)
Dept: FAMILY MEDICINE CLINIC | Facility: CLINIC | Age: 70
End: 2025-07-29
Payer: MEDICARE

## 2025-07-29 ENCOUNTER — LABORATORY ENCOUNTER (OUTPATIENT)
Dept: LAB | Age: 70
End: 2025-07-29
Attending: FAMILY MEDICINE

## 2025-07-29 ENCOUNTER — PATIENT MESSAGE (OUTPATIENT)
Dept: FAMILY MEDICINE CLINIC | Facility: CLINIC | Age: 70
End: 2025-07-29

## 2025-07-29 VITALS
SYSTOLIC BLOOD PRESSURE: 118 MMHG | HEART RATE: 60 BPM | BODY MASS INDEX: 30.35 KG/M2 | HEIGHT: 73 IN | OXYGEN SATURATION: 95 % | RESPIRATION RATE: 18 BRPM | DIASTOLIC BLOOD PRESSURE: 66 MMHG | WEIGHT: 229 LBS | TEMPERATURE: 98 F

## 2025-07-29 DIAGNOSIS — M48.062 LUMBAR STENOSIS WITH NEUROGENIC CLAUDICATION: ICD-10-CM

## 2025-07-29 DIAGNOSIS — J20.9 BRONCHITIS WITH BRONCHOSPASM: ICD-10-CM

## 2025-07-29 DIAGNOSIS — Z12.5 SCREENING FOR PROSTATE CANCER: ICD-10-CM

## 2025-07-29 DIAGNOSIS — G47.33 OSA ON CPAP: ICD-10-CM

## 2025-07-29 DIAGNOSIS — R73.01 IMPAIRED FASTING GLUCOSE: ICD-10-CM

## 2025-07-29 DIAGNOSIS — K44.9 HIATAL HERNIA WITH GERD: ICD-10-CM

## 2025-07-29 DIAGNOSIS — I10 ESSENTIAL HYPERTENSION: ICD-10-CM

## 2025-07-29 DIAGNOSIS — M54.50 CHRONIC BILATERAL LOW BACK PAIN WITHOUT SCIATICA: ICD-10-CM

## 2025-07-29 DIAGNOSIS — Z00.00 ENCOUNTER FOR ANNUAL HEALTH EXAMINATION: Primary | ICD-10-CM

## 2025-07-29 DIAGNOSIS — F17.201 TOBACCO DEPENDENCE IN REMISSION: ICD-10-CM

## 2025-07-29 DIAGNOSIS — E66.811 OBESITY (BMI 30.0-34.9): ICD-10-CM

## 2025-07-29 DIAGNOSIS — E78.5 DYSLIPIDEMIA: ICD-10-CM

## 2025-07-29 DIAGNOSIS — Z13.0 SCREENING, ANEMIA, DEFICIENCY, IRON: ICD-10-CM

## 2025-07-29 DIAGNOSIS — Z96.642 S/P HIP REPLACEMENT, LEFT: ICD-10-CM

## 2025-07-29 DIAGNOSIS — K21.9 HIATAL HERNIA WITH GERD: ICD-10-CM

## 2025-07-29 DIAGNOSIS — G89.29 CHRONIC BILATERAL LOW BACK PAIN WITHOUT SCIATICA: ICD-10-CM

## 2025-07-29 DIAGNOSIS — Z87.891 PERSONAL HISTORY OF TOBACCO USE, PRESENTING HAZARDS TO HEALTH: ICD-10-CM

## 2025-07-29 DIAGNOSIS — J30.1 SEASONAL ALLERGIC RHINITIS DUE TO POLLEN: ICD-10-CM

## 2025-07-29 DIAGNOSIS — Z96.652 STATUS POST LEFT KNEE REPLACEMENT: ICD-10-CM

## 2025-07-29 LAB
ALBUMIN SERPL-MCNC: 4.7 G/DL (ref 3.2–4.8)
ALBUMIN/GLOB SERPL: 2 (ref 1–2)
ALP LIVER SERPL-CCNC: 119 U/L (ref 45–117)
ALT SERPL-CCNC: 22 U/L (ref 10–49)
ANION GAP SERPL CALC-SCNC: 10 MMOL/L (ref 0–18)
AST SERPL-CCNC: 23 U/L (ref ?–34)
BASOPHILS # BLD AUTO: 0.04 X10(3) UL (ref 0–0.2)
BASOPHILS NFR BLD AUTO: 0.4 %
BILIRUB SERPL-MCNC: 0.6 MG/DL (ref 0.2–1.1)
BUN BLD-MCNC: 12 MG/DL (ref 9–23)
CALCIUM BLD-MCNC: 9.8 MG/DL (ref 8.7–10.6)
CHLORIDE SERPL-SCNC: 105 MMOL/L (ref 98–112)
CHOLEST SERPL-MCNC: 148 MG/DL (ref ?–200)
CO2 SERPL-SCNC: 26 MMOL/L (ref 21–32)
COMPLEXED PSA SERPL-MCNC: 1.09 NG/ML (ref ?–4)
CREAT BLD-MCNC: 0.88 MG/DL (ref 0.7–1.3)
EGFRCR SERPLBLD CKD-EPI 2021: 94 ML/MIN/1.73M2 (ref 60–?)
EOSINOPHIL # BLD AUTO: 0.33 X10(3) UL (ref 0–0.7)
EOSINOPHIL NFR BLD AUTO: 3.5 %
ERYTHROCYTE [DISTWIDTH] IN BLOOD BY AUTOMATED COUNT: 14.2 %
FASTING PATIENT LIPID ANSWER: YES
FASTING STATUS PATIENT QL REPORTED: YES
GLOBULIN PLAS-MCNC: 2.4 G/DL (ref 2–3.5)
GLUCOSE BLD-MCNC: 108 MG/DL (ref 70–99)
HCT VFR BLD AUTO: 46.1 % (ref 39–53)
HDLC SERPL-MCNC: 58 MG/DL (ref 40–59)
HGB BLD-MCNC: 15.1 G/DL (ref 13–17.5)
IMM GRANULOCYTES # BLD AUTO: 0.07 X10(3) UL (ref 0–1)
IMM GRANULOCYTES NFR BLD: 0.8 %
LDLC SERPL CALC-MCNC: 77 MG/DL (ref ?–100)
LYMPHOCYTES # BLD AUTO: 1.93 X10(3) UL (ref 1–4)
LYMPHOCYTES NFR BLD AUTO: 20.8 %
MCH RBC QN AUTO: 29.7 PG (ref 26–34)
MCHC RBC AUTO-ENTMCNC: 32.8 G/DL (ref 31–37)
MCV RBC AUTO: 90.7 FL (ref 80–100)
MONOCYTES # BLD AUTO: 1.03 X10(3) UL (ref 0.1–1)
MONOCYTES NFR BLD AUTO: 11.1 %
NEUTROPHILS # BLD AUTO: 5.9 X10 (3) UL (ref 1.5–7.7)
NEUTROPHILS # BLD AUTO: 5.9 X10(3) UL (ref 1.5–7.7)
NEUTROPHILS NFR BLD AUTO: 63.4 %
NONHDLC SERPL-MCNC: 90 MG/DL (ref ?–130)
OSMOLALITY SERPL CALC.SUM OF ELEC: 292 MOSM/KG (ref 275–295)
PLATELET # BLD AUTO: 248 10(3)UL (ref 150–450)
POTASSIUM SERPL-SCNC: 4.8 MMOL/L (ref 3.5–5.1)
PROT SERPL-MCNC: 7.1 G/DL (ref 5.7–8.2)
RBC # BLD AUTO: 5.08 X10(6)UL (ref 3.8–5.8)
SODIUM SERPL-SCNC: 141 MMOL/L (ref 136–145)
TRIGL SERPL-MCNC: 63 MG/DL (ref 30–149)
VLDLC SERPL CALC-MCNC: 10 MG/DL (ref 0–30)
WBC # BLD AUTO: 9.3 X10(3) UL (ref 4–11)

## 2025-07-29 PROCEDURE — 80061 LIPID PANEL: CPT

## 2025-07-29 PROCEDURE — 85025 COMPLETE CBC W/AUTO DIFF WBC: CPT

## 2025-07-29 PROCEDURE — 36415 COLL VENOUS BLD VENIPUNCTURE: CPT

## 2025-07-29 PROCEDURE — 83036 HEMOGLOBIN GLYCOSYLATED A1C: CPT

## 2025-07-29 PROCEDURE — 80053 COMPREHEN METABOLIC PANEL: CPT

## 2025-07-29 RX ORDER — TIRZEPATIDE 2.5 MG/.5ML
2.5 INJECTION, SOLUTION SUBCUTANEOUS WEEKLY
Qty: 2 ML | Refills: 0 | Status: SHIPPED | OUTPATIENT
Start: 2025-07-29 | End: 2025-08-20

## 2025-07-29 RX ORDER — ALBUTEROL SULFATE 0.83 MG/ML
SOLUTION RESPIRATORY (INHALATION)
COMMUNITY
Start: 2025-07-19

## 2025-07-30 LAB
EST. AVERAGE GLUCOSE BLD GHB EST-MCNC: 120 MG/DL (ref 68–126)
HBA1C MFR BLD: 5.8 % (ref ?–5.7)

## 2025-08-06 RX ORDER — TIRZEPATIDE 2.5 MG/.5ML
2.5 INJECTION, SOLUTION SUBCUTANEOUS WEEKLY
Qty: 2 ML | Refills: 0 | Status: SHIPPED | OUTPATIENT
Start: 2025-08-06 | End: 2025-08-28

## 2025-08-11 ENCOUNTER — HOSPITAL ENCOUNTER (OUTPATIENT)
Dept: CT IMAGING | Facility: HOSPITAL | Age: 70
Discharge: HOME | End: 2025-08-11
Attending: FAMILY MEDICINE

## 2025-08-11 DIAGNOSIS — Z87.891 PERSONAL HISTORY OF TOBACCO USE, PRESENTING HAZARDS TO HEALTH: ICD-10-CM

## 2025-08-11 DIAGNOSIS — R73.01 IMPAIRED FASTING GLUCOSE: ICD-10-CM

## 2025-08-11 PROCEDURE — 71271 CT THORAX LUNG CANCER SCR C-: CPT | Performed by: FAMILY MEDICINE

## 2025-08-25 DIAGNOSIS — E66.811 OBESITY (BMI 30.0-34.9): ICD-10-CM

## 2025-08-25 RX ORDER — TIRZEPATIDE 2.5 MG/.5ML
INJECTION, SOLUTION SUBCUTANEOUS
Qty: 2 ML | Refills: 0 | OUTPATIENT
Start: 2025-08-25

## 2025-08-25 RX ORDER — TIRZEPATIDE 5 MG/.5ML
5 INJECTION, SOLUTION SUBCUTANEOUS WEEKLY
Qty: 2 ML | Refills: 0 | Status: SHIPPED | OUTPATIENT
Start: 2025-08-25 | End: 2025-09-16

## 2025-08-26 RX ORDER — TIRZEPATIDE 5 MG/.5ML
5 INJECTION, SOLUTION SUBCUTANEOUS WEEKLY
Qty: 2 ML | Refills: 0 | Status: SHIPPED | OUTPATIENT
Start: 2025-08-26 | End: 2025-09-17

## (undated) DEVICE — CODMAN® SURGICAL PATTIES 1/2" X1 1/2" (1.27CM X 3.81CM): Brand: CODMAN®

## (undated) DEVICE — SPECIMEN CONTAINER,POSITIVE SEAL INDICATOR, OR PACKAGED: Brand: PRECISION

## (undated) DEVICE — DECANTER BAG 9": Brand: MEDLINE INDUSTRIES, INC.

## (undated) DEVICE — 3.0MM PRECISION NEURO (MATCH HEAD)

## (undated) DEVICE — OIL CARTRIDGE: Brand: CORE, MAESTRO

## (undated) DEVICE — LIGHT HANDLE

## (undated) DEVICE — C-ARM: Brand: UNBRANDED

## (undated) DEVICE — 450 ML BOTTLE OF 0.05% CHLORHEXIDINE GLUCONATE IN 99.95% STERILE WATER FOR IRRIGATION, USP AND APPLICATOR.: Brand: IRRISEPT ANTIMICROBIAL WOUND LAVAGE

## (undated) DEVICE — SLEEVE KENDALL SCD EXPRESS MED

## (undated) DEVICE — GLOVE SURG SENSICARE SZ 6-1/2

## (undated) DEVICE — DRILL SRG OIL CRTDG MAESTRO

## (undated) DEVICE — AVANOS* TUOHY EPIDURAL NEEDLE: Brand: AVANOS

## (undated) DEVICE — CLOSURE EXOFIN 1.0ML

## (undated) DEVICE — CHLORAPREP 26ML APPLICATOR

## (undated) DEVICE — BLADE ELECTRODE: Brand: EDGE

## (undated) DEVICE — SOL NACL IRRIG 0.9% 1000ML BTL

## (undated) DEVICE — GOWN SURG AERO CHROME XXL

## (undated) DEVICE — MLPD DISPOSABLE PAD (6' ROLL) 3 ROLLS: Brand: SCHAERER MEDICAL USA

## (undated) DEVICE — Device: Brand: INTELLICART™

## (undated) DEVICE — STERILE POLYISOPRENE POWDER-FREE SURGICAL GLOVES: Brand: PROTEXIS

## (undated) DEVICE — PILLOW FX 56X38X15CM MED HIP

## (undated) DEVICE — SUTURE VICRYL 2-0 CP-1

## (undated) DEVICE — COVER,TABLE,44X90,STERILE: Brand: MEDLINE

## (undated) DEVICE — PAIN TRAY: Brand: MEDLINE INDUSTRIES, INC.

## (undated) DEVICE — STERILE POLYISOPRENE POWDER-FREE SURGICAL GLOVES WITH EMOLLIENT COATING: Brand: PROTEXIS

## (undated) DEVICE — DRAPE,U/SHT,SPLIT,FILM,60X84,STERILE: Brand: MEDLINE

## (undated) DEVICE — WRAP COOLING BACK W/NO PILLOW

## (undated) DEVICE — DRESSING AQUACEL AG 3.5 X 10

## (undated) DEVICE — TOTAL HIP CDS: Brand: MEDLINE INDUSTRIES, INC.

## (undated) DEVICE — SCREW SET SPNE VRST GRY
Type: IMPLANTABLE DEVICE | Site: BACK | Status: NON-FUNCTIONAL
Removed: 2023-03-01

## (undated) DEVICE — BIPOLAR SEALER 23-112-1 AQM 6.0: Brand: AQUAMANTYS™

## (undated) DEVICE — DIFFUSER: Brand: CORE, MAESTRO

## (undated) DEVICE — SUT VICRYL 2-0 CP-2 J762D

## (undated) DEVICE — 3M™ TEGADERM™ TRANSPARENT FILM DRESSING, 1626W, 4 IN X 4-3/4 IN (10 CM X 12 CM), 50 EACH/CARTON, 4 CARTON/CASE: Brand: 3M™ TEGADERM™

## (undated) DEVICE — 2T11 #2 PDO 36 X 36: Brand: 2T11 #2 PDO 36 X 36

## (undated) DEVICE — DRAPE,LAPAROTOMY,PCH,STERILE: Brand: MEDLINE

## (undated) DEVICE — ALCOHOL 70% 4 OZ

## (undated) DEVICE — SUTURE VICRYL 1 OS-6

## (undated) DEVICE — Device: Brand: STABLECUT®

## (undated) DEVICE — GLOVE SURG SENSICARE SZ 7-1/2

## (undated) DEVICE — Device

## (undated) DEVICE — CARTRIDGE: Brand: PREP PLUS

## (undated) DEVICE — MEGADYNE E-Z CLEAN BLADE 2.75"

## (undated) DEVICE — BANDAID CURAD 3IN X 1IN

## (undated) DEVICE — KENDALL SCD EXPRESS SLEEVES, KNEE LENGTH, MEDIUM: Brand: KENDALL SCD

## (undated) DEVICE — STERILE HOOK LOCK LATEX FREE ELASTIC BANDAGE 6INX5YD: Brand: HOOK LOCK™

## (undated) DEVICE — SKIN MARKER DUAL TIP WITH RULER CAP AND LABELS: Brand: DEVON

## (undated) DEVICE — KIT DRN 1/8IN PVC 3 SPRG EVAC

## (undated) DEVICE — GAUZE SPONGES,12 PLY: Brand: CURITY

## (undated) DEVICE — FRAZIER SUCTION INSTRUMENT 12 FR W/CONTROL VENT & OBTURATOR: Brand: FRAZIER

## (undated) DEVICE — FLOSEAL WITH RECOTHROM - 5ML: Brand: FLOSEAL HEMOSTATIC MATRIX

## (undated) DEVICE — SUT VICRYL 0 CT-1 JJ31G

## (undated) DEVICE — MEDIUM BLADE: Brand: MILL PLUS

## (undated) DEVICE — HOOD, PEEL-AWAY: Brand: FLYTE

## (undated) DEVICE — SUT MONOCRYL 3-0 PS-2 Y427H

## (undated) DEVICE — LAMINECTOMY CDS: Brand: MEDLINE INDUSTRIES, INC.

## (undated) DEVICE — REMOVER PREP SOLUTION 4OZ

## (undated) DEVICE — NON-ADHERENT PAD PREPACK: Brand: TELFA

## (undated) NOTE — LETTER
[facility]  Phone: [phone]  OUTSIDE TESTING RESULT REQUEST      TO:   Laura Donahue Today's Date: 8/23/19    FAX #: [phone]     IMPORTANT: FOR YOUR IMMEDIATE ATTENTION  Please FAX all test results listed below

## (undated) NOTE — LETTER
OUTSIDE TESTING RESULT REQUEST     IMPORTANT: FOR YOUR IMMEDIATE ATTENTION  Please FAX all test results listed below to: [phone]       * * * * If testing is NOT complete, arrange with patient A.S.A.P. * * * *      Patient Name: Chente Sung  Surgery Date: 3/1/2023  CSN: [id number]  Medical Record: [id number]   : 1955 - A: 78 y      Sex: male  Surgeon(s):  Taylor Saenz MD  Procedure: LUMBAR 4  -5  POSTERIOR INSTRUMENTED FUSION, AND LUMBAR 1 - 2, LUMBAR 2 -3, LUMBAR 3-4, LUMBAR 4-5  LAMINECTOMY  Anesthesia Type: General     Surgeon: Taylor Saenz MD     The following Testing and Time Line are REQUIRED PER ANESTHESIA     PT/INR can be done with other labs thru pre op appt. Spoke with surgeon. Shepherd for previous fax requesting not to do . Thank You,   Sent by: Rosa GIRON Rn

## (undated) NOTE — LETTER
01/22/18        [address] [facility] [address]          Dear Elias Bunn,    Our records indicate that you are due for fasting blood work (lipid panel, CMP, HgA1c) and (PSA)    Please call our office during normal business hours so

## (undated) NOTE — LETTER
23  [address] [facility]  Pre-Operative Clearance Request    Patient Name:   Patsy Schilder             :   1955    Surgeon: Dr. Jocelyne King             Date of Surgery: 3/1/23    Surgical Procedure: L4, L5 LUMBAR DECOMPRESSION       Please Complete: 2-3 WEEKS PRIOR TO SURGERY TO AVOID CANCELLATION OF SURGERY. [x]  History and Physical       [x]  Medical  Clearance                     Testing is ordered by Yobani KNOWLES per anesthesia guidelines                         **Please fax test results, H&P, and clearance to [phone] and to                                      P. A.T at [phone]**